# Patient Record
Sex: FEMALE | Race: ASIAN | NOT HISPANIC OR LATINO | Employment: OTHER | URBAN - METROPOLITAN AREA
[De-identification: names, ages, dates, MRNs, and addresses within clinical notes are randomized per-mention and may not be internally consistent; named-entity substitution may affect disease eponyms.]

---

## 2023-04-02 ENCOUNTER — APPOINTMENT (OUTPATIENT)
Dept: CT IMAGING | Facility: CLINIC | Age: 68
End: 2023-04-02
Attending: EMERGENCY MEDICINE
Payer: COMMERCIAL

## 2023-04-02 ENCOUNTER — HOSPITAL ENCOUNTER (EMERGENCY)
Facility: CLINIC | Age: 68
Discharge: ANOTHER HOSPITAL | End: 2023-04-03
Attending: EMERGENCY MEDICINE | Admitting: EMERGENCY MEDICINE
Payer: COMMERCIAL

## 2023-04-02 DIAGNOSIS — I63.531: ICD-10-CM

## 2023-04-02 LAB
ANION GAP SERPL CALCULATED.3IONS-SCNC: 11 MMOL/L (ref 7–15)
APTT PPP: 23 SECONDS (ref 22–38)
BASOPHILS # BLD AUTO: 0.1 10E3/UL (ref 0–0.2)
BASOPHILS NFR BLD AUTO: 1 %
BUN SERPL-MCNC: 11.7 MG/DL (ref 8–23)
CALCIUM SERPL-MCNC: 9.8 MG/DL (ref 8.8–10.2)
CHLORIDE SERPL-SCNC: 101 MMOL/L (ref 98–107)
CREAT SERPL-MCNC: 0.55 MG/DL (ref 0.51–0.95)
DEPRECATED HCO3 PLAS-SCNC: 26 MMOL/L (ref 22–29)
EOSINOPHIL # BLD AUTO: 0.6 10E3/UL (ref 0–0.7)
EOSINOPHIL NFR BLD AUTO: 6 %
ERYTHROCYTE [DISTWIDTH] IN BLOOD BY AUTOMATED COUNT: 12.9 % (ref 10–15)
GFR SERPL CREATININE-BSD FRML MDRD: >90 ML/MIN/1.73M2
GLUCOSE SERPL-MCNC: 127 MG/DL (ref 70–99)
HCT VFR BLD AUTO: 39.5 % (ref 35–47)
HGB BLD-MCNC: 12.7 G/DL (ref 11.7–15.7)
IMM GRANULOCYTES # BLD: 0 10E3/UL
IMM GRANULOCYTES NFR BLD: 0 %
INR PPP: 0.95 (ref 0.85–1.15)
LYMPHOCYTES # BLD AUTO: 1.3 10E3/UL (ref 0.8–5.3)
LYMPHOCYTES NFR BLD AUTO: 13 %
MCH RBC QN AUTO: 29.3 PG (ref 26.5–33)
MCHC RBC AUTO-ENTMCNC: 32.2 G/DL (ref 31.5–36.5)
MCV RBC AUTO: 91 FL (ref 78–100)
MONOCYTES # BLD AUTO: 0.4 10E3/UL (ref 0–1.3)
MONOCYTES NFR BLD AUTO: 4 %
NEUTROPHILS # BLD AUTO: 7.4 10E3/UL (ref 1.6–8.3)
NEUTROPHILS NFR BLD AUTO: 76 %
NRBC # BLD AUTO: 0 10E3/UL
NRBC BLD AUTO-RTO: 0 /100
PLATELET # BLD AUTO: 250 10E3/UL (ref 150–450)
POTASSIUM SERPL-SCNC: 3.9 MMOL/L (ref 3.4–5.3)
RBC # BLD AUTO: 4.33 10E6/UL (ref 3.8–5.2)
SODIUM SERPL-SCNC: 138 MMOL/L (ref 136–145)
TROPONIN T SERPL HS-MCNC: <6 NG/L
WBC # BLD AUTO: 9.7 10E3/UL (ref 4–11)

## 2023-04-02 PROCEDURE — 84484 ASSAY OF TROPONIN QUANT: CPT | Performed by: EMERGENCY MEDICINE

## 2023-04-02 PROCEDURE — 99291 CRITICAL CARE FIRST HOUR: CPT | Mod: 25

## 2023-04-02 PROCEDURE — 250N000011 HC RX IP 250 OP 636: Performed by: EMERGENCY MEDICINE

## 2023-04-02 PROCEDURE — 85610 PROTHROMBIN TIME: CPT | Performed by: EMERGENCY MEDICINE

## 2023-04-02 PROCEDURE — 96375 TX/PRO/DX INJ NEW DRUG ADDON: CPT

## 2023-04-02 PROCEDURE — 70450 CT HEAD/BRAIN W/O DYE: CPT | Mod: 76

## 2023-04-02 PROCEDURE — 82310 ASSAY OF CALCIUM: CPT | Performed by: EMERGENCY MEDICINE

## 2023-04-02 PROCEDURE — 85730 THROMBOPLASTIN TIME PARTIAL: CPT | Performed by: EMERGENCY MEDICINE

## 2023-04-02 PROCEDURE — 70498 CT ANGIOGRAPHY NECK: CPT

## 2023-04-02 PROCEDURE — 70496 CT ANGIOGRAPHY HEAD: CPT

## 2023-04-02 PROCEDURE — 85025 COMPLETE CBC W/AUTO DIFF WBC: CPT | Performed by: EMERGENCY MEDICINE

## 2023-04-02 PROCEDURE — 36415 COLL VENOUS BLD VENIPUNCTURE: CPT | Performed by: EMERGENCY MEDICINE

## 2023-04-02 PROCEDURE — 93005 ELECTROCARDIOGRAM TRACING: CPT

## 2023-04-02 PROCEDURE — 250N000009 HC RX 250: Performed by: EMERGENCY MEDICINE

## 2023-04-02 PROCEDURE — 70450 CT HEAD/BRAIN W/O DYE: CPT

## 2023-04-02 PROCEDURE — 96376 TX/PRO/DX INJ SAME DRUG ADON: CPT | Mod: 59

## 2023-04-02 PROCEDURE — 96365 THER/PROPH/DIAG IV INF INIT: CPT | Mod: 59

## 2023-04-02 RX ORDER — ONDANSETRON 2 MG/ML
4 INJECTION INTRAMUSCULAR; INTRAVENOUS
Status: COMPLETED | OUTPATIENT
Start: 2023-04-02 | End: 2023-04-02

## 2023-04-02 RX ORDER — FENTANYL CITRATE 50 UG/ML
50 INJECTION, SOLUTION INTRAMUSCULAR; INTRAVENOUS ONCE
Status: COMPLETED | OUTPATIENT
Start: 2023-04-02 | End: 2023-04-02

## 2023-04-02 RX ORDER — HYDROMORPHONE HYDROCHLORIDE 1 MG/ML
0.5 INJECTION, SOLUTION INTRAMUSCULAR; INTRAVENOUS; SUBCUTANEOUS ONCE
Status: COMPLETED | OUTPATIENT
Start: 2023-04-02 | End: 2023-04-02

## 2023-04-02 RX ORDER — IOPAMIDOL 755 MG/ML
500 INJECTION, SOLUTION INTRAVASCULAR ONCE
Status: COMPLETED | OUTPATIENT
Start: 2023-04-02 | End: 2023-04-02

## 2023-04-02 RX ADMIN — IOPAMIDOL 75 ML: 755 INJECTION, SOLUTION INTRAVENOUS at 19:12

## 2023-04-02 RX ADMIN — NICARDIPINE HYDROCHLORIDE 2.5 MG/HR: 0.2 INJECTION, SOLUTION INTRAVENOUS at 19:34

## 2023-04-02 RX ADMIN — HYDROMORPHONE HYDROCHLORIDE 0.5 MG: 1 INJECTION, SOLUTION INTRAMUSCULAR; INTRAVENOUS; SUBCUTANEOUS at 20:38

## 2023-04-02 RX ADMIN — SODIUM CHLORIDE 61 ML: 9 INJECTION, SOLUTION INTRAVENOUS at 19:15

## 2023-04-02 RX ADMIN — ONDANSETRON 4 MG: 2 INJECTION INTRAMUSCULAR; INTRAVENOUS at 19:28

## 2023-04-02 RX ADMIN — FENTANYL CITRATE 50 MCG: 50 INJECTION, SOLUTION INTRAMUSCULAR; INTRAVENOUS at 19:32

## 2023-04-02 RX ADMIN — ONDANSETRON 4 MG: 2 INJECTION INTRAMUSCULAR; INTRAVENOUS at 20:38

## 2023-04-02 ASSESSMENT — ENCOUNTER SYMPTOMS
BACK PAIN: 1
NUMBNESS: 0
VOMITING: 0
HEADACHES: 1
NAUSEA: 1
DIZZINESS: 1

## 2023-04-02 ASSESSMENT — ACTIVITIES OF DAILY LIVING (ADL)
ADLS_ACUITY_SCORE: 35

## 2023-04-03 ENCOUNTER — APPOINTMENT (OUTPATIENT)
Dept: OCCUPATIONAL THERAPY | Facility: CLINIC | Age: 68
DRG: 064 | End: 2023-04-03
Attending: STUDENT IN AN ORGANIZED HEALTH CARE EDUCATION/TRAINING PROGRAM
Payer: COMMERCIAL

## 2023-04-03 ENCOUNTER — APPOINTMENT (OUTPATIENT)
Dept: CARDIOLOGY | Facility: CLINIC | Age: 68
DRG: 064 | End: 2023-04-03
Attending: NURSE PRACTITIONER
Payer: COMMERCIAL

## 2023-04-03 ENCOUNTER — HOSPITAL ENCOUNTER (INPATIENT)
Facility: CLINIC | Age: 68
LOS: 2 days | Discharge: HOME | DRG: 064 | End: 2023-04-05
Attending: PSYCHIATRY & NEUROLOGY | Admitting: PSYCHIATRY & NEUROLOGY
Payer: COMMERCIAL

## 2023-04-03 ENCOUNTER — APPOINTMENT (OUTPATIENT)
Dept: MRI IMAGING | Facility: CLINIC | Age: 68
DRG: 064 | End: 2023-04-03
Attending: NURSE PRACTITIONER
Payer: COMMERCIAL

## 2023-04-03 VITALS
OXYGEN SATURATION: 99 % | TEMPERATURE: 98.3 F | BODY MASS INDEX: 20.24 KG/M2 | HEIGHT: 62 IN | SYSTOLIC BLOOD PRESSURE: 126 MMHG | WEIGHT: 110 LBS | HEART RATE: 72 BPM | RESPIRATION RATE: 19 BRPM | DIASTOLIC BLOOD PRESSURE: 63 MMHG

## 2023-04-03 DIAGNOSIS — I61.0 NONTRAUMATIC SUBCORTICAL HEMORRHAGE OF RIGHT CEREBRAL HEMISPHERE (H): Primary | ICD-10-CM

## 2023-04-03 DIAGNOSIS — M54.50 LOW BACK PAIN, UNSPECIFIED BACK PAIN LATERALITY, UNSPECIFIED CHRONICITY, UNSPECIFIED WHETHER SCIATICA PRESENT: ICD-10-CM

## 2023-04-03 DIAGNOSIS — I10 HYPERTENSION, UNSPECIFIED TYPE: ICD-10-CM

## 2023-04-03 PROBLEM — I61.9 ICH (INTRACEREBRAL HEMORRHAGE) (H): Status: ACTIVE | Noted: 2023-04-03

## 2023-04-03 LAB
ANION GAP SERPL CALCULATED.3IONS-SCNC: 13 MMOL/L (ref 7–15)
ATRIAL RATE - MUSE: 56 BPM
ATRIAL RATE - MUSE: 66 BPM
BUN SERPL-MCNC: 7.9 MG/DL (ref 8–23)
CALCIUM SERPL-MCNC: 9.1 MG/DL (ref 8.8–10.2)
CHLORIDE SERPL-SCNC: 105 MMOL/L (ref 98–107)
CREAT SERPL-MCNC: 0.48 MG/DL (ref 0.51–0.95)
DEPRECATED HCO3 PLAS-SCNC: 21 MMOL/L (ref 22–29)
DIASTOLIC BLOOD PRESSURE - MUSE: NORMAL MMHG
DIASTOLIC BLOOD PRESSURE - MUSE: NORMAL MMHG
ERYTHROCYTE [DISTWIDTH] IN BLOOD BY AUTOMATED COUNT: 12.8 % (ref 10–15)
GFR SERPL CREATININE-BSD FRML MDRD: >90 ML/MIN/1.73M2
GLUCOSE BLDC GLUCOMTR-MCNC: 109 MG/DL (ref 70–99)
GLUCOSE BLDC GLUCOMTR-MCNC: 111 MG/DL (ref 70–99)
GLUCOSE BLDC GLUCOMTR-MCNC: 112 MG/DL (ref 70–99)
GLUCOSE BLDC GLUCOMTR-MCNC: 123 MG/DL (ref 70–99)
GLUCOSE SERPL-MCNC: 107 MG/DL (ref 70–99)
HBA1C MFR BLD: 5.5 %
HCT VFR BLD AUTO: 39.7 % (ref 35–47)
HGB BLD-MCNC: 12.4 G/DL (ref 11.7–15.7)
INTERPRETATION ECG - MUSE: NORMAL
INTERPRETATION ECG - MUSE: NORMAL
LDLC SERPL DIRECT ASSAY-MCNC: 88 MG/DL
LVEF ECHO: NORMAL
MAGNESIUM SERPL-MCNC: 2 MG/DL (ref 1.7–2.3)
MCH RBC QN AUTO: 29 PG (ref 26.5–33)
MCHC RBC AUTO-ENTMCNC: 31.2 G/DL (ref 31.5–36.5)
MCV RBC AUTO: 93 FL (ref 78–100)
MRSA DNA SPEC QL NAA+PROBE: NEGATIVE
P AXIS - MUSE: 55 DEGREES
P AXIS - MUSE: 67 DEGREES
PLATELET # BLD AUTO: 250 10E3/UL (ref 150–450)
POTASSIUM SERPL-SCNC: 3.6 MMOL/L (ref 3.4–5.3)
PR INTERVAL - MUSE: 174 MS
PR INTERVAL - MUSE: 176 MS
QRS DURATION - MUSE: 74 MS
QRS DURATION - MUSE: 90 MS
QT - MUSE: 434 MS
QT - MUSE: 466 MS
QTC - MUSE: 449 MS
QTC - MUSE: 454 MS
R AXIS - MUSE: 0 DEGREES
R AXIS - MUSE: 8 DEGREES
RBC # BLD AUTO: 4.28 10E6/UL (ref 3.8–5.2)
SA TARGET DNA: POSITIVE
SARS-COV-2 RNA RESP QL NAA+PROBE: NEGATIVE
SODIUM SERPL-SCNC: 139 MMOL/L (ref 136–145)
SYSTOLIC BLOOD PRESSURE - MUSE: NORMAL MMHG
SYSTOLIC BLOOD PRESSURE - MUSE: NORMAL MMHG
T AXIS - MUSE: 29 DEGREES
T AXIS - MUSE: 30 DEGREES
VENTRICULAR RATE- MUSE: 56 BPM
VENTRICULAR RATE- MUSE: 66 BPM
WBC # BLD AUTO: 9.3 10E3/UL (ref 4–11)

## 2023-04-03 PROCEDURE — A9585 GADOBUTROL INJECTION: HCPCS | Performed by: PSYCHIATRY & NEUROLOGY

## 2023-04-03 PROCEDURE — U0005 INFEC AGEN DETEC AMPLI PROBE: HCPCS | Performed by: STUDENT IN AN ORGANIZED HEALTH CARE EDUCATION/TRAINING PROGRAM

## 2023-04-03 PROCEDURE — 85027 COMPLETE CBC AUTOMATED: CPT | Performed by: STUDENT IN AN ORGANIZED HEALTH CARE EDUCATION/TRAINING PROGRAM

## 2023-04-03 PROCEDURE — 258N000003 HC RX IP 258 OP 636: Performed by: STUDENT IN AN ORGANIZED HEALTH CARE EDUCATION/TRAINING PROGRAM

## 2023-04-03 PROCEDURE — 36415 COLL VENOUS BLD VENIPUNCTURE: CPT | Performed by: STUDENT IN AN ORGANIZED HEALTH CARE EDUCATION/TRAINING PROGRAM

## 2023-04-03 PROCEDURE — 99291 CRITICAL CARE FIRST HOUR: CPT | Mod: GC | Performed by: PSYCHIATRY & NEUROLOGY

## 2023-04-03 PROCEDURE — 82962 GLUCOSE BLOOD TEST: CPT

## 2023-04-03 PROCEDURE — 250N000013 HC RX MED GY IP 250 OP 250 PS 637: Performed by: STUDENT IN AN ORGANIZED HEALTH CARE EDUCATION/TRAINING PROGRAM

## 2023-04-03 PROCEDURE — 93306 TTE W/DOPPLER COMPLETE: CPT

## 2023-04-03 PROCEDURE — 87641 MR-STAPH DNA AMP PROBE: CPT | Performed by: STUDENT IN AN ORGANIZED HEALTH CARE EDUCATION/TRAINING PROGRAM

## 2023-04-03 PROCEDURE — 70546 MR ANGIOGRAPH HEAD W/O&W/DYE: CPT

## 2023-04-03 PROCEDURE — 999N000226 HC STATISTIC SLP IP EVAL DEFER: Performed by: SPEECH-LANGUAGE PATHOLOGIST

## 2023-04-03 PROCEDURE — 200N000002 HC R&B ICU UMMC

## 2023-04-03 PROCEDURE — 250N000011 HC RX IP 250 OP 636: Performed by: STUDENT IN AN ORGANIZED HEALTH CARE EDUCATION/TRAINING PROGRAM

## 2023-04-03 PROCEDURE — 70553 MRI BRAIN STEM W/O & W/DYE: CPT

## 2023-04-03 PROCEDURE — 93010 ELECTROCARDIOGRAM REPORT: CPT | Performed by: INTERNAL MEDICINE

## 2023-04-03 PROCEDURE — 83721 ASSAY OF BLOOD LIPOPROTEIN: CPT | Performed by: STUDENT IN AN ORGANIZED HEALTH CARE EDUCATION/TRAINING PROGRAM

## 2023-04-03 PROCEDURE — 82310 ASSAY OF CALCIUM: CPT | Performed by: STUDENT IN AN ORGANIZED HEALTH CARE EDUCATION/TRAINING PROGRAM

## 2023-04-03 PROCEDURE — 258N000003 HC RX IP 258 OP 636: Performed by: PSYCHIATRY & NEUROLOGY

## 2023-04-03 PROCEDURE — 999N000128 HC STATISTIC PERIPHERAL IV START W/O US GUIDANCE

## 2023-04-03 PROCEDURE — 97530 THERAPEUTIC ACTIVITIES: CPT | Mod: GO | Performed by: OCCUPATIONAL THERAPIST

## 2023-04-03 PROCEDURE — 70553 MRI BRAIN STEM W/O & W/DYE: CPT | Mod: 26 | Performed by: RADIOLOGY

## 2023-04-03 PROCEDURE — 93005 ELECTROCARDIOGRAM TRACING: CPT

## 2023-04-03 PROCEDURE — 83036 HEMOGLOBIN GLYCOSYLATED A1C: CPT | Performed by: STUDENT IN AN ORGANIZED HEALTH CARE EDUCATION/TRAINING PROGRAM

## 2023-04-03 PROCEDURE — 93306 TTE W/DOPPLER COMPLETE: CPT | Mod: 26 | Performed by: STUDENT IN AN ORGANIZED HEALTH CARE EDUCATION/TRAINING PROGRAM

## 2023-04-03 PROCEDURE — 97535 SELF CARE MNGMENT TRAINING: CPT | Mod: GO | Performed by: OCCUPATIONAL THERAPIST

## 2023-04-03 PROCEDURE — 97110 THERAPEUTIC EXERCISES: CPT | Mod: GO | Performed by: OCCUPATIONAL THERAPIST

## 2023-04-03 PROCEDURE — 97165 OT EVAL LOW COMPLEX 30 MIN: CPT | Mod: GO | Performed by: OCCUPATIONAL THERAPIST

## 2023-04-03 PROCEDURE — 250N000013 HC RX MED GY IP 250 OP 250 PS 637: Performed by: PSYCHIATRY & NEUROLOGY

## 2023-04-03 PROCEDURE — 83735 ASSAY OF MAGNESIUM: CPT | Performed by: PSYCHIATRY & NEUROLOGY

## 2023-04-03 PROCEDURE — 255N000002 HC RX 255 OP 636: Performed by: PSYCHIATRY & NEUROLOGY

## 2023-04-03 RX ORDER — POLYETHYLENE GLYCOL 3350 17 G/17G
17 POWDER, FOR SOLUTION ORAL DAILY
Status: DISCONTINUED | OUTPATIENT
Start: 2023-04-03 | End: 2023-04-05 | Stop reason: HOSPADM

## 2023-04-03 RX ORDER — ATORVASTATIN CALCIUM 10 MG/1
10 TABLET, FILM COATED ORAL DAILY
COMMUNITY

## 2023-04-03 RX ORDER — SODIUM CHLORIDE 9 MG/ML
INJECTION, SOLUTION INTRAVENOUS CONTINUOUS
Status: ACTIVE | OUTPATIENT
Start: 2023-04-03 | End: 2023-04-05

## 2023-04-03 RX ORDER — PROCHLORPERAZINE MALEATE 5 MG
5 TABLET ORAL EVERY 6 HOURS PRN
Status: DISCONTINUED | OUTPATIENT
Start: 2023-04-03 | End: 2023-04-05 | Stop reason: HOSPADM

## 2023-04-03 RX ORDER — ACETAMINOPHEN 325 MG/10.15ML
650 LIQUID ORAL EVERY 4 HOURS PRN
Status: DISCONTINUED | OUTPATIENT
Start: 2023-04-03 | End: 2023-04-05 | Stop reason: HOSPADM

## 2023-04-03 RX ORDER — TRAZODONE HYDROCHLORIDE 100 MG/1
100 TABLET ORAL AT BEDTIME
COMMUNITY

## 2023-04-03 RX ORDER — AMOXICILLIN 250 MG
1-2 CAPSULE ORAL 2 TIMES DAILY
Status: DISCONTINUED | OUTPATIENT
Start: 2023-04-03 | End: 2023-04-05 | Stop reason: HOSPADM

## 2023-04-03 RX ORDER — ONDANSETRON 4 MG/1
4 TABLET, ORALLY DISINTEGRATING ORAL EVERY 6 HOURS PRN
Status: DISCONTINUED | OUTPATIENT
Start: 2023-04-03 | End: 2023-04-05 | Stop reason: HOSPADM

## 2023-04-03 RX ORDER — ACETAMINOPHEN 325 MG/1
650 TABLET ORAL EVERY 4 HOURS PRN
Status: DISCONTINUED | OUTPATIENT
Start: 2023-04-03 | End: 2023-04-05 | Stop reason: HOSPADM

## 2023-04-03 RX ORDER — GADOBUTROL 604.72 MG/ML
7.5 INJECTION INTRAVENOUS ONCE
Status: COMPLETED | OUTPATIENT
Start: 2023-04-03 | End: 2023-04-03

## 2023-04-03 RX ORDER — ACETAMINOPHEN 650 MG/1
650 SUPPOSITORY RECTAL EVERY 4 HOURS PRN
Status: DISCONTINUED | OUTPATIENT
Start: 2023-04-03 | End: 2023-04-05 | Stop reason: HOSPADM

## 2023-04-03 RX ORDER — ATORVASTATIN CALCIUM 10 MG/1
10 TABLET, FILM COATED ORAL EVERY EVENING
Status: DISCONTINUED | OUTPATIENT
Start: 2023-04-03 | End: 2023-04-03

## 2023-04-03 RX ORDER — TRAZODONE HYDROCHLORIDE 100 MG/1
100 TABLET ORAL AT BEDTIME
Status: DISCONTINUED | OUTPATIENT
Start: 2023-04-04 | End: 2023-04-03

## 2023-04-03 RX ORDER — ALENDRONATE SODIUM 70 MG/1
70 TABLET ORAL
COMMUNITY

## 2023-04-03 RX ORDER — FLUTICASONE PROPIONATE 50 MCG
1 SPRAY, SUSPENSION (ML) NASAL DAILY PRN
Status: DISCONTINUED | OUTPATIENT
Start: 2023-04-03 | End: 2023-04-05 | Stop reason: HOSPADM

## 2023-04-03 RX ORDER — LABETALOL HYDROCHLORIDE 5 MG/ML
10 INJECTION, SOLUTION INTRAVENOUS EVERY 10 MIN PRN
Status: DISCONTINUED | OUTPATIENT
Start: 2023-04-03 | End: 2023-04-05 | Stop reason: HOSPADM

## 2023-04-03 RX ORDER — PROCHLORPERAZINE 25 MG
12.5 SUPPOSITORY, RECTAL RECTAL EVERY 12 HOURS PRN
Status: DISCONTINUED | OUTPATIENT
Start: 2023-04-03 | End: 2023-04-05 | Stop reason: HOSPADM

## 2023-04-03 RX ORDER — ATORVASTATIN CALCIUM 10 MG/1
10 TABLET, FILM COATED ORAL EVERY EVENING
Status: DISCONTINUED | OUTPATIENT
Start: 2023-04-03 | End: 2023-04-05 | Stop reason: HOSPADM

## 2023-04-03 RX ORDER — AMOXICILLIN 250 MG
1-2 CAPSULE ORAL 2 TIMES DAILY
Status: DISCONTINUED | OUTPATIENT
Start: 2023-04-03 | End: 2023-04-03

## 2023-04-03 RX ORDER — BISACODYL 10 MG
10 SUPPOSITORY, RECTAL RECTAL DAILY PRN
Status: DISCONTINUED | OUTPATIENT
Start: 2023-04-03 | End: 2023-04-05 | Stop reason: HOSPADM

## 2023-04-03 RX ORDER — ONDANSETRON 2 MG/ML
4 INJECTION INTRAMUSCULAR; INTRAVENOUS EVERY 6 HOURS PRN
Status: DISCONTINUED | OUTPATIENT
Start: 2023-04-03 | End: 2023-04-05 | Stop reason: HOSPADM

## 2023-04-03 RX ORDER — HYDRALAZINE HYDROCHLORIDE 20 MG/ML
10-20 INJECTION INTRAMUSCULAR; INTRAVENOUS
Status: DISCONTINUED | OUTPATIENT
Start: 2023-04-03 | End: 2023-04-05 | Stop reason: HOSPADM

## 2023-04-03 RX ADMIN — ONDANSETRON 4 MG: 2 INJECTION INTRAMUSCULAR; INTRAVENOUS at 06:17

## 2023-04-03 RX ADMIN — ONDANSETRON 4 MG: 4 TABLET, ORALLY DISINTEGRATING ORAL at 19:54

## 2023-04-03 RX ADMIN — GADOBUTROL 7.5 ML: 604.72 INJECTION INTRAVENOUS at 19:04

## 2023-04-03 RX ADMIN — ACETAMINOPHEN 650 MG: 325 TABLET, FILM COATED ORAL at 06:17

## 2023-04-03 RX ADMIN — SODIUM CHLORIDE: 9 INJECTION, SOLUTION INTRAVENOUS at 15:08

## 2023-04-03 RX ADMIN — SODIUM CHLORIDE: 9 INJECTION, SOLUTION INTRAVENOUS at 02:12

## 2023-04-03 RX ADMIN — SODIUM CHLORIDE 100 ML: 9 INJECTION, SOLUTION INTRAVENOUS at 19:05

## 2023-04-03 RX ADMIN — ACETAMINOPHEN 650 MG: 325 TABLET, FILM COATED ORAL at 02:20

## 2023-04-03 RX ADMIN — PROCHLORPERAZINE EDISYLATE 5 MG: 5 INJECTION INTRAMUSCULAR; INTRAVENOUS at 02:20

## 2023-04-03 RX ADMIN — ACETAMINOPHEN 650 MG: 325 TABLET, FILM COATED ORAL at 17:58

## 2023-04-03 RX ADMIN — PROCHLORPERAZINE EDISYLATE 5 MG: 5 INJECTION INTRAMUSCULAR; INTRAVENOUS at 09:03

## 2023-04-03 RX ADMIN — ATORVASTATIN CALCIUM 10 MG: 10 TABLET, FILM COATED ORAL at 21:38

## 2023-04-03 RX ADMIN — LABETALOL HYDROCHLORIDE 10 MG: 5 INJECTION, SOLUTION INTRAVENOUS at 16:25

## 2023-04-03 RX ADMIN — PROCHLORPERAZINE EDISYLATE 5 MG: 5 INJECTION INTRAMUSCULAR; INTRAVENOUS at 22:00

## 2023-04-03 RX ADMIN — ACETAMINOPHEN 650 MG: 325 TABLET, FILM COATED ORAL at 22:00

## 2023-04-03 RX ADMIN — HYDRALAZINE HYDROCHLORIDE 10 MG: 20 INJECTION INTRAMUSCULAR; INTRAVENOUS at 06:17

## 2023-04-03 ASSESSMENT — ACTIVITIES OF DAILY LIVING (ADL)
ADLS_ACUITY_SCORE: 26
ADLS_ACUITY_SCORE: 25
ADLS_ACUITY_SCORE: 37
ADLS_ACUITY_SCORE: 25
ADLS_ACUITY_SCORE: 39
WALKING_OR_CLIMBING_STAIRS_DIFFICULTY: NO
FALL_HISTORY_WITHIN_LAST_SIX_MONTHS: NO
ADLS_ACUITY_SCORE: 25
CHANGE_IN_FUNCTIONAL_STATUS_SINCE_ONSET_OF_CURRENT_ILLNESS/INJURY: NO
ADLS_ACUITY_SCORE: 25
DRESSING/BATHING_DIFFICULTY: NO
DIFFICULTY_EATING/SWALLOWING: NO
DOING_ERRANDS_INDEPENDENTLY_DIFFICULTY: NO
WEAR_GLASSES_OR_BLIND: NO
TOILETING_ISSUES: NO
ADLS_ACUITY_SCORE: 39
CONCENTRATING,_REMEMBERING_OR_MAKING_DECISIONS_DIFFICULTY: NO
ADLS_ACUITY_SCORE: 26
ADLS_ACUITY_SCORE: 25
ADLS_ACUITY_SCORE: 28

## 2023-04-03 ASSESSMENT — VISUAL ACUITY
OU: GLASSES

## 2023-04-03 NOTE — ED TRIAGE NOTES
"Patient presents with complaints of sudden onset headache, vomiting,  dizziness, and vision changes which started at 1430. Patient states \"worst headache of life\". Of note, patient did hit her head on Thursday. ABC intact without need for intervention at this time.         "

## 2023-04-03 NOTE — ED NOTES
RN attempted to call and give report to [facility] unit 4A twice. Both times RN was told that receiving nurse was busy and they would call back in about 5 minutes to get report.

## 2023-04-03 NOTE — PLAN OF CARE
Major Shift Events:    Q2 hour neuro checks initiated. AOx4. PERRLA. HA and associated nausea relieved w prn tylenol and compazine. x1 episode of nausea. L visual field cut present. Glasses worn at baseline. No N/T. Afebrile. VSS. SBP goal <140, prn hydralazine given. On RA. Swallow study passed. Up to bedside commode. NS MIV continued.     Plan:   Continue with POC, notify primary team with any changes in pt condition.     For vital signs and complete assessments, please see documentation flowsheets.

## 2023-04-03 NOTE — PLAN OF CARE
Admitted/transferred from: [facility] ED   Reason for admission/transfer: Close neurological monitoring   2 RN skin assessment: completed by: CLYDE Stinson and CLYDE Hearn   Result of skin assessment and interventions/actions: No interventions required   Height, weight, drug calc weight: Done  Patient belongings Done  MDRO education added to care plan: NA  ?

## 2023-04-03 NOTE — PLAN OF CARE
Time: 0700 - 1930    Major Shift Events: Frontal/parietal headache improved today, Tylenol given x1. Otherwise no acute neuro changes. SBP goal <140, labetalol given x1 with effect. Tele NSR. Tmax 99.3. Pt had nausea and dry heaving this AM, compazine given x1 with improvement, tolerating clear liquid diet by end of shift. No BM, but bowel sounds active. Voiding WDL, SBA to toilet. Ambulated with OT in room this AM. Pt went to MRI at 1815.       Plan: Continue to monitor neuro status, awaiting MRI result. Encourage activity as tolerated. Stroke education with patient learning tomorrow at 1330. Follow plan of care.     For vital signs and complete assessments, please see documentation flowsheets.       Goal Outcome Evaluation:      Plan of Care Reviewed With: patient, spouse    Overall Patient Progress: improving

## 2023-04-03 NOTE — PHARMACY-ADMISSION MEDICATION HISTORY
Pharmacist Admission Medication History    Admission medication history is complete. The information provided in this note is only as accurate as the sources available at the time of the update.    Medication reconciliation/reorder completed by provider prior to medication history? Yes    Information Source(s): Patient and CareEverywhere/SureScripts via in-person    Pertinent Information: Patient was a modest historian, recalling the types of medications she takes but not always their names or strengths.    Changes made to PTA medication list:    Added:  o Atorvastatin 10 mg tab PO every day  o Trazodone 100 mg tab PO at bedtime  o Alendronate 70 mg tab PO every day    Deleted: None    Changed: None    Allergies reviewed with patient and updates made in EHR: yes    Medication History Completed By: Germain Meier RPH 4/3/2023 1:31 PM    Prior to Admission Medications   Prescriptions Last Dose Informant Patient Reported? Taking?   alendronate (FOSAMAX) 70 MG tablet 4/3/2023  Yes Yes   Sig: Take 70 mg by mouth every 7 days   atorvastatin (LIPITOR) 10 MG tablet 4/3/2023  Yes Yes   Sig: Take 10 mg by mouth daily   traZODone (DESYREL) 100 MG tablet 4/2/2023  Yes Yes   Sig: Take 100 mg by mouth At Bedtime      Facility-Administered Medications: None

## 2023-04-03 NOTE — CARE PLAN
Speech Language Pathology: Orders received. Chart reviewed and discussed with patient and nursing staff.? Speech Language Pathology not indicated due to swallow function at baseline, passing nursing swallow screen this AM, and no other reported concerns regarding speech/language function.?Page sent to primary provider team regarding SLP plans to defer evaluation. Defer discharge recommendations to primary provider team.? Will complete orders.

## 2023-04-03 NOTE — ED PROVIDER NOTES
History     Chief Complaint:  Headache       HPI   Danna Fairchild is a 68 year old female with a history of hyperlipidemia who presents with headache. The patient reports falling and hitting her head three days ago. Around 1430 today, she had a sudden onset of headache, dizziness, nausea, and loss of peripheral vision. She describes the headache as the worst she has experienced, and states she does not get migraines or headaches frequently. She has not had these symptoms since the fall prior to today. Here in the ED, she reports feeling disoriented. Additional lower back pain from the fall. No vomiting, numbness, or tingling.       Independent Historian:   None - Patient Only    Review of External Notes:  None    ROS:  Review of Systems   Eyes: Positive for visual disturbance.   Gastrointestinal: Positive for nausea. Negative for vomiting.   Musculoskeletal: Positive for back pain.   Neurological: Positive for dizziness and headaches. Negative for numbness.   All other systems reviewed and are negative.      Allergies:  No known drug allergies     Medications:    Oxybutynin  Trazodone  Alendronate  Atorvastatin    Past Medical History:    Hyperlipidemia  Major depression  Anxiety  Osteoporosis  Lumbago    Past Surgical History:    Cataract removal   Puncture aspiration breast cyst  Bilateral wrist fracture ORIF    Family History:    Father- stomach cancer, hypertension, stroke, macular degeneration  Mother- hypertension, stroke, cataracts    Social History:  The patient presents via private vehicle   at bedside    Physical Exam     Patient Vitals for the past 24 hrs:   BP Temp Pulse Resp SpO2 Height Weight   04/02/23 2125 118/70 -- 59 11 95 % -- --   04/02/23 2115 119/76 -- 60 11 95 % -- --   04/02/23 2112 -- -- 59 10 97 % -- --   04/02/23 2111 -- -- 60 21 97 % -- --   04/02/23 2110 126/74 -- 63 29 94 % -- --   04/02/23 2055 112/67 -- 61 11 94 % -- --   04/02/23 2051 -- -- 62 11 91 % -- --   04/02/23 2044  "125/68 -- 65 17 100 % -- --   04/02/23 2002 -- -- 64 22 96 % -- --   04/02/23 2001 -- -- 62 11 98 % -- --   04/02/23 2000 135/73 -- 62 10 98 % -- --   04/02/23 1959 -- -- 58 15 100 % -- --   04/02/23 1958 -- -- 62 13 100 % -- --   04/02/23 1955 132/84 -- 66 -- -- -- --   04/02/23 1950 (!) 149/78 -- 59 14 99 % -- --   04/02/23 1946 -- -- 59 14 100 % -- --   04/02/23 1945 (!) 152/82 -- 59 -- -- -- --   04/02/23 1940 (!) 150/86 -- 60 14 100 % -- --   04/02/23 1935 (!) 153/82 -- 51 12 99 % -- --   04/02/23 1920 (!) 162/87 -- 54 19 100 % -- --   04/02/23 1903 -- -- -- -- -- 1.575 m (5' 2\") 49.9 kg (110 lb)   04/02/23 1901 (!) 158/78 98.3  F (36.8  C) 78 20 99 % -- --        Physical Exam  Nursing note and vitals reviewed.  HENT:   Mouth/Throat: Moist mucous membranes.   Eyes: EOMI, nonicteric sclera  Cardiovascular: Normal rate, regular rhythm, no murmurs, rubs, or gallops  Pulmonary/Chest: Effort normal and breath sounds normal. No respiratory distress. No wheezes. No rales.   Abdominal: Soft. Nontender, nondistended, no guarding or rigidity.   Musculoskeletal: Normal range of motion.   Neurological: Alert. Moves all extremities spontaneously.     CN's II-XII intact. 5/5 BUE and BLE strength. PERRL    EOMI without nystagmus.  Left-sided hemianopia.     Sensation intact to light touch. Negative pronator drift.    Finger to nose intact.   Skin: Skin is warm and dry. No rash noted.     Emergency Department Course   ECG  ECG taken at 1927, ECG read at 1930  Sinus bradycardia   Rate 56 bpm. MD interval 174 ms. QRS duration 74 ms. QT/QTc 466/449 ms. P-R-T axes 55 0 30.      Imaging:  CTA Head Neck with Contrast   Final Result   IMPRESSION:    HEAD CT:   1.  Approximately 3.3 cm intraparenchymal hematoma right occipital lobe and parieto-occipital lobes.   2.  There is hemorrhagic extension into the ventricular system and basal cisterns of the posterior fossa.   3.  No midline shift.      HEAD CTA:    1.  No contrast " extravasation.   2.  No vascular occlusion, stenosis, aneurysm or high flow vascular malformation.      NECK CTA:   1.  No hemodynamically significant stenosis in the neck vessels.    2.  No evidence for dissection.   3.  Changes of fibromuscular dysplasia involving bilateral internal carotid arteries and left vertebral artery V1 and V3 segments.      As discussed with Dr. Villalta at 1914 at 1934 Central time.      CT Head w/o Contrast   Final Result   IMPRESSION:    HEAD CT:   1.  Approximately 3.3 cm intraparenchymal hematoma right occipital lobe and parieto-occipital lobes.   2.  There is hemorrhagic extension into the ventricular system and basal cisterns of the posterior fossa.   3.  No midline shift.      HEAD CTA:    1.  No contrast extravasation.   2.  No vascular occlusion, stenosis, aneurysm or high flow vascular malformation.      NECK CTA:   1.  No hemodynamically significant stenosis in the neck vessels.    2.  No evidence for dissection.   3.  Changes of fibromuscular dysplasia involving bilateral internal carotid arteries and left vertebral artery V1 and V3 segments.      As discussed with Dr. Villalta at 1914 at 1934 Central time.         Report per radiology    Laboratory:  Labs Ordered and Resulted from Time of ED Arrival to Time of ED Departure   BASIC METABOLIC PANEL - Abnormal       Result Value    Sodium 138      Potassium 3.9      Chloride 101      Carbon Dioxide (CO2) 26      Anion Gap 11      Urea Nitrogen 11.7      Creatinine 0.55      Calcium 9.8      Glucose 127 (*)     GFR Estimate >90     INR - Normal    INR 0.95     PARTIAL THROMBOPLASTIN TIME - Normal    aPTT 23     TROPONIN T, HIGH SENSITIVITY - Normal    Troponin T, High Sensitivity <6     GLUCOSE MONITOR NURSING POCT   CBC WITH PLATELETS AND DIFFERENTIAL    WBC Count 9.7      RBC Count 4.33      Hemoglobin 12.7      Hematocrit 39.5      MCV 91      MCH 29.3      MCHC 32.2      RDW 12.9      Platelet Count 250      % Neutrophils  76      % Lymphocytes 13      % Monocytes 4      % Eosinophils 6      % Basophils 1      % Immature Granulocytes 0      NRBCs per 100 WBC 0      Absolute Neutrophils 7.4      Absolute Lymphocytes 1.3      Absolute Monocytes 0.4      Absolute Eosinophils 0.6      Absolute Basophils 0.1      Absolute Immature Granulocytes 0.0      Absolute NRBCs 0.0          Emergency Department Course & Assessments:     Interventions:  Medications   niCARdipine 40 mg in 200 mL NS (CARDENE) infusion (0 mg/hr Intravenous Stopped 4/2/23 2059)   Sodium Chloride for CT Scan Flush Use (61 mLs Intravenous $Given 4/2/23 1915)   iopamidol (ISOVUE-370) solution 500 mL (75 mLs Intravenous $Given 4/2/23 1912)   ondansetron (ZOFRAN) injection 4 mg (4 mg Intravenous $Given 4/2/23 2038)   fentaNYL (PF) (SUBLIMAZE) injection 50 mcg (50 mcg Intravenous $Given 4/2/23 1932)   HYDROmorphone (PF) (DILAUDID) injection 0.5 mg (0.5 mg Intravenous $Given 4/2/23 2038)      Assessments:  1856 I obtained history and examined the patient as noted above.   1858 Tier 1 code stroke.   1901 First blood pressure is 158/78.    Independent Interpretation (X-rays, CTs, rhythm strip):  Noncontrast head CT independently reviewed and right occipital intracranial hemorrhage with ventricular extension noted.  Mild shift posteriorly.    Consultations/Discussion of Management or Tests:  1902 I spoke with Dr. Spencer from stroke neurology regarding the patient's presentation.   1915 I spoke with [place] radiology regarding imaging results. There is a right posterior cerebral circulation hemorrhagic infarction.   1930 I spoke with JOEY Rg, from neurosurgery regarding the patient's presentation and imaging. Will not plan for intervention unless patient presentation changes.   2036 I spoke with Dr. Phuc Garcia from [facility] neurocritical unit. Plan for patient transfer.     Social Determinants of Health affecting care:   None    Disposition:  The [facility]. Dr. Garcia accepted the patient for transfer.     Impression & Plan      Medical Decision Making:  Patient presents with acute onset headache, described as worst ever with onset at 230pm today.  Complaint prompted a tier 1 code stroke.  Neurologic exam was only positive for left-sided hemianopia.  Noncontrast CT showed hemorrhage in the right occipital lobe which explains vision loss.  Patient remained stable during time in ED including 4-hour repeat head CT.  She was briefly started on a nicardipine drip for blood pressure control, but this was able to be weaned off after better pain control.  No evidence of hydrocephalus.  I discussed case with neurosurgery and neuro critical care.  No planned intervention pending clinical status.  Uncertain etiology of bleed as CTA is negative for obvious aneurysmal bleed or AVM.  She will likely need MRI for further evaluation.  ICU bed available at [facility] where patient will be transferred.  Her and her  are in agreement with this plan.  All questions answered.    Critical Care time:  Was 45 minutes for this patient excluding procedures.    Diagnosis:    ICD-10-CM    1. Posterior cerebral circulation hemorrhagic infarction, right (H)  I63.531             Scribe Disclosure:  ILita, am serving as a scribe at 7:08 PM on 4/2/2023 to document services personally performed by Jose Villalta MD based on my observations and the provider's statements to me.           Jose Villalta MD  04/03/23 [id number]       Jose Villalta MD  04/03/23 9423

## 2023-04-03 NOTE — CONSULTS
[facility]    Stroke Telephone Note    I was called by Jose Villalta on 04/02/23 regarding patient Danna Fairchild. The patient is a 68 year old female who had sudden onset of severe headache at 2:30 pm. In the ED, her neurological exam is significant for left hemianopia, according to the ED MD. Of note, the patient did have an episode of loss of consciousness 3 days ago (3/30/23) and she did fall and hit her head. The patient is not on anticoagulants.     Stroke Code Data (for stroke code without tele)  Stroke code activated 04/02/23   1900   Stroke provider first response  04/02/23   1901            Last known normal 04/02/23   1430        Time of discovery   (or onset of symptoms) 04/02/23   1430   Head CT read by Stroke Neuro Dr/Provider 04/02/23   1914   Was stroke code de-escalated? No              Imaging Findings   Head CT: right occipital ICH, about 25 mL in volume, with subarachnoid and intraventricular hemorrhage and hydrocephalic changes of the left lateral ventricle. The hemorrhage density is heterogenous but there is no spot sign.   CTA head and neck: unremarkable.     Intravenous Thrombolysis  Not given due to:   - active bleeding    Endovascular Treatment  Intracranial hemorrhage not LVO     Impression  Non-traumatic intracerebral hemorrhage of unknown cause as workup is incomplete. The lobar location is suggestive of cerebral amyloid angiopathy. There is history of trauma 3 days ago but unlikely to cause a late hemorrhage. the patient is not on anticoagulation.       Recommendations   Acute Hemorrhagic Stroke Recommendations:   - Patient will need to be transferred to a hospital with neurocritical care and neurosurgerical capability.   - Neurochecks and Vital Signs every 30 min for the first 4 hours then every 1 hour  - Systolic BP Goal: 130-150  - Head of bed elevated   - Telemetry, EKG  - Neurosurgery consult   - Imaging: repeat head CT in 4 hours  - Bedside Glucose  "Monitoring  - A1c, Troponin x 3  - PT/OT/SLP  - Stroke Education  - Euthermia, Euglycemia    My recommendations are based on the information provided over the phone by Danna Fairchild's in-person providers. They are not intended to replace the clinical judgment of her in-person providers. I was not requested to personally see or examine the patient at this time.      Allison Spencer MD, Msc, FAKERA, FAAN   of Neurology  [facility]     04/02/2023 7:26 PM  To page me or covering stroke neurology team member, click here: AMCOM  Choose \"On Call\" tab at top, then search dropdown box for \"Neurology Adult\" & press Enter, look for Neuro ICU/Stroke      "

## 2023-04-03 NOTE — PROGRESS NOTES
04/03/23 0900   Living Environment   People in Home spouse   Current Living Arrangements house   Home Accessibility stairs to enter home;stairs within home   Number of Stairs, Main Entrance   (3)   Stair Railings, Main Entrance none   Number of Stairs, Within Home, Primary   (one flight to bedroom upper level one flight to basement pt does use for laundry and treadmill)   Stair Railings, Within Home, Primary railings safe and in good condition  (one side up and one side down for rails)   Transportation Anticipated car, drives self   Self-Care   Usual Activity Tolerance excellent   Current Activity Tolerance moderate   Regular Exercise Yes   Activity/Exercise Type swimming;walking   General Information   Onset of Illness/Injury or Date of Surgery 04/03/23   Referring Physician Lisa Flores MD   Additional Occupational Profile Info/Pertinent History of Current Problem Ms. Danna Fairchild is a 68 year old woman with little past medical history admitted on 4/3/23 as a transfer from [facility] for right occipital/parietal intraparenchymal hemorrhage with intraventricular extension.  She does not have a known history of hypertension.  She did have a fall 3 days prior to onset of sudden severe headache, per her description is likely a syncopal event, where she did lose consciousness and hit her head however. Not on pta aspirin or anticoagulation. Due to location of bleed as well as intraventricular extension (risk of obstructive hydrocephalus developing), she was transferred to the ICU at [facility] for further monitoring and neuro critical care management.  Overall exam is very reassuring on her arrival to neuro critical care unit.   Existing Precautions/Restrictions fall;other (see comments)  (SBP >140)   Visual Perception   Impact of Vision Impairment on Function (Vision) Left field cut unable to track past 45 degrees from midline, unable to notice visual stimuli until midline when approaching from the  left, (neglect) when standing behind pt for visual screen   Range of Motion Comprehensive   Comment, General Range of Motion WFL   Strength Comprehensive (MMT)   Comment, General Manual Muscle Testing (MMT) Assessment overall deconditioned   Bed Mobility   Comment (Bed Mobility) SBA   Transfers   Transfer Comments SBA   Lower Body Dressing Assessment/Training   Comment, (Lower Body Dressing) SBA after education   Clinical Impression   Criteria for Skilled Therapeutic Interventions Met (OT) Yes, treatment indicated   OT Diagnosis decreased ind in I/ADLS   OT Problem List-Impairments impacting ADL problems related to;activity tolerance impaired;strength;vision   ADL comments/analysis decreased ind in I/ADLS   Assessment of Occupational Performance 1-3 Performance Deficits   Planned Therapy Interventions (OT) ADL retraining;IADL retraining;strengthening;home program guidelines;progressive activity/exercise;visual perception;risk factor education   Clinical Decision Making Complexity (OT) low complexity   Risk & Benefits of therapy have been explained evaluation/treatment results reviewed;care plan/treatment goals reviewed;patient   OT Total Evaluation Time   OT Eval, Low Complexity Minutes (22184) 5

## 2023-04-03 NOTE — H&P
Neuroscience Intensive Care History & Physical    Reason for critical care admission: intracranial hemorrhage  Admitting Team: Neuro ICU  Date of Service:  04/03/2023  Date of Admission: 04/03/23  Hospital Day: 1    Assessment/Plan  Ms. Danna Fairchild is a 68 year old woman with little past medical history admitted on 4/3/23 as a transfer from [facility] for right occipital/parietal intraparenchymal hemorrhage with intraventricular extension.  She does not have a known history of hypertension.  She did have a fall 3 days prior to onset of sudden severe headache, per her description is likely a syncopal event, where she did lose consciousness and hit her head however. Not on pta aspirin or anticoagulation. Due to location of bleed as well as intraventricular extension (risk of obstructive hydrocephalus developing), she was transferred to the ICU at [facility] for further monitoring and neuro critical care management.  Overall exam is very reassuring on her arrival to neuro critical care unit.    24 hour events: PBD 0, transferred to [facility]    Neuro    #Right Occipitoparietal Hemorrhage, ICH Score = 1, NIHSS = 2  Initial head CT 4/2 at 19:00 shows Right occipitoparietal hemorrhage up to 39mm in diameter with associated ventricular extension and mass effect. 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  Follow up CT 4/2 at 23:00 largely stable without rapidly increasing size of hemorrhage nor worsening mass effect. Of note, hemorrhage extends into basal cisterns and 3rd and 4th ventricle, would be cautious for risk of developing obstructive hydrocephalus (low threshold to consult neurosurgery for EVD).   -Repeat head CT 4 hours from original was stable  -Neurochecks every 2 hrs  -HOB > 30   -SBP goal < 140 mmHg  -avoid hypotonic fluids  -PT/OT/SLP    #depression  #anxiety  #insomnia  -pta trazodone 100mg  qhs    #Analgesics & sedation  -IVF, IV compazine, acetaminophen prn for HA    CV  -Cardiac monitoring  -SBP goal < 140 mmHg  -PRN labetalol and hydralazine    #HLD  -continue pta atorvastatin 10mg daily    Resp  #SHAREE  -Continuous pulse ox  -Maintain O2 saturations greater than 92%    #allergic rhinitis  -pta flonase 1 spray into both nostrils daily prn    GI  Diet: regular  GI prophylaxis: not indicated  -Bowel regimen: scheduled senna-docusate and Miralax    Renal/  #urge incontinence  -pta oxybutynin 5mg daily    -Daily BMP  -IV fluids: 75mL/hr NS  -Electrolyte replacement protocols    Endo  #SHAREE  -Hgb A1c  -Monitor glucose levels    #osteoporosis  -pta alendronate weekly (hold for now)    Heme  #SHAREE  -Daily CBC  -Hgb goal >7, plt goal >50k  -Transfuse to meet Hgb and plt goals    ID  #SHAREE  -Daily CBC  -Follow temperature curve    ICU Check List  Lines/tubes/drains: PIV  FEN: NS at 75c/hr, regular diet, replete per protocol  PPX: DVT - SCDs (pharm contraindicated due to bleed); GI - not needed  Code: FULL (discussed with patient at bedside  Dispo: ICU - NCC    Clinically Significant Risk Factors Present on Admission                            # Compression of brain         The patient was discussed with the NCC attending, Dr. Garcia.     Lisa Flores MD  PGY4 Neurology      History of Present Illness:  Ms. Danna Fairchild is a 68 year old woman with a past medical history of urge incontinence, osteoporosis, allergic rhinitis, HLD, depression/anxiety, insomnia who was transferred from [facility] to [facility] on 4/3/23 for intraparenchymal hemorrhage. She presented to [facility] on 04/02/23 with headache and nausea/vomiting around 14:30. Recent history of fall and head trauma (3/30) with LOC. Stroke code was activated on arrival to [facility], exam notable for left hemianopia per chart review. She was found to have a right occipital/parietal ICH with IVH. Blood pressure elevated to 150/160s systolic, lowered with  "nicardipine drip. Given fentanyl and hydromorphone for pain. Subsequently transferred to [facility] for neuro critical care management.     On history and exam at bedside today Ms. Fairchild notes that she currently has a 5 out of 10 headache.  She endorses being largely helpfully at baseline without history of chronic headaches.  She notes that day of presentation 4/2/2023 she had sudden onset of \"worst headache of my life\" at approximately 2:00 PM a friend drove her home and she took a nap, when she woke up she attempted to use her phone and realized that she could not see out of the left side of her vision, subsequently presented to the emergency department at [facility].  Evaluation was done and found a right ICH.  She been experiencing some severe headache and nausea on and off since initial onset of severe headache at 2 PM.  No trauma on day presentation 4/2, however she did note that she recently had a fall where she hit her head.  Early a.m. on 3/30 she had awoken from sleep and went to use the restroom.  After using the bathroom and standing up from seated she had an episode that she describes as \"vasovagal\" fainting.  She notes that this is happened to her 1 time previously in her life.  She woke up on the floor of the bathroom.  She felt mostly normal and went back to bed afterwards.  She later discovered a bump on her head that was tender to the touch, but not until a day or 2 later.  She does endorse a lot of recent stress including sick family pet who is currently hospitalized at the [facility] hospital.  She attributed her spell to a combination of stress and syncope.  No history of seizures or epilepsy.  No tongue bite or urinary incontinence.    She has not been feeling unwell recently, no fever, chills, diarrhea, cough, shortness of breath, chest pain.  No nausea, vomiting (prior to onset of headache).      ALLERGIES:  No Known Allergies    PAST MEDICAL HISTORY:   PAST SURGICAL HISTORY: " "  -Osteoporosis  -Insomnia, depression, anxiety  -Hyperlipidemia    FAMILY HISTORY:   Problem Relation Age of Onset     Cataract Birth Mother     Macular Degeneration Birth Mother   possibly AMD     Hypertension Birth Mother     Stroke Birth Mother     Hypertension Birth Father     Stroke Birth Father   around 2009     Macular Degeneration Birth Father     Cancer Birth Father   Stomach cancer     Cancer, Breast Other   maternal gt aunt     SOCIAL HISTORY:   Flutist, drummer. Never tobacco use. Occasional alcohol (1 drink per week or less). No other substance use.  She is a musician.    ROS: The 10 point Review of Systems is negative other than noted in the HPI or here.     Current Medications:  Current Facility-Administered Medications   Medication     acetaminophen (TYLENOL) tablet 650 mg    Or     acetaminophen (TYLENOL) solution 650 mg    Or     acetaminophen (TYLENOL) Suppository 650 mg     atorvastatin (LIPITOR) tablet 10 mg     bisacodyl (DULCOLAX) suppository 10 mg     hydrALAZINE (APRESOLINE) injection 10-20 mg     labetalol (NORMODYNE/TRANDATE) injection 10 mg     ondansetron (ZOFRAN ODT) ODT tab 4 mg    Or     ondansetron (ZOFRAN) injection 4 mg     polyethylene glycol (MIRALAX) Packet 17 g     prochlorperazine (COMPAZINE) injection 5 mg    Or     prochlorperazine (COMPAZINE) tablet 5 mg    Or     prochlorperazine (COMPAZINE) suppository 12.5 mg     senna-docusate (SENOKOT-S/PERICOLACE) 8.6-50 MG per tablet 1-2 tablet     sodium chloride 0.9% infusion     [START ON 4/4/2023] traZODone (DESYREL) tablet 100 mg         Physical Examination:  Vitals: Vital signs:                         Estimated body mass index is 20.12 kg/m  as calculated from the following:    Height as of 4/2/23: 1.575 m (5' 2\").    Weight as of 4/2/23: 49.9 kg (110 lb).    Physical Exam:  Constitutional: Alert. Lying in bed comfortably. No acute distress.   Head: Atraumatic, normocephalic. No significant scalp abrasion palpated  ENT: " Moist mucous membranes. No sinus drainage. Neck supple.  Cardiovascular: Appears warm, well-perfused, and non-toxic. Regular rate  Respiratory: No respiratory distress. No increased work of breathing, no accessory muscle use.   Gastrointestinal: Soft, nontender, nondistended   Musculoskeletal: Moving all four limbs spontaneously  Skin: No rashes appreciated on visualized skin.   Hematologic/Lymphatic/Immunologic: No bruising appreciated on visualized skin.     Neurologic Exam:   Mental Status: Alert and oriented to place, date, self and reasons of hospitalization.  Following commands. Naming, repetition intact without paraphasic errors. No dysarthria. Able to perform simple calculations.   Cranial Nerves: PERRL, EOMI without nystagmus, face symmetric, facial sensation intact, nondysarthric, shoulder shrug strong, tongue midline. Not wearing glasses (home with ) so vision exam somewhat confounded. Does appear to have a homonymous hemianopsia on blink to threat however.  Motor: No pronator or sensory drift. Strength 5/5 proximally and distally. Tone normal. No abnormal movements.   Reflexes: brisk but symmetric throughout at biceps, brachioradialis, patellars and achilles. No ankle clonus, toes upgoing.   Sensory: Intact to light touch in all four extremities. No sensory extinction.   Coordination: FNF intact without dysmetria, finger tapping symmetric  Gait: Deferred due to falls risk/critical illness     NIHSS  Interval transfer (04/03/23 0203)   1a. Level of Consciousness 0-->Alert, keenly responsive   1b. LOC Questions 0-->Answers both questions correctly   1c. LOC Commands 0-->Performs both tasks correctly   2.   Best Gaze 0-->Normal   3.   Visual 2-->Complete hemianopia   4.   Facial Palsy 0-->Normal symmetrical movements   5a. Motor Arm, Left 0-->No drift, limb holds 90 (or 45) degrees for full 10 secs   5b. Motor Arm, Right 0-->No drift, limb holds 90 (or 45) degrees for full 10 secs   6a. Motor Leg,  Left 0-->No drift, leg holds 30 degree position for full 5 secs   6b. Motor Leg, right 0-->No drift, leg holds 30 degree position for full 5 secs   7.   Limb Ataxia 0-->Absent   8.   Sensory 0-->Normal, no sensory loss   9.   Best Language 0-->No aphasia, normal   10. Dysarthria 0-->Normal   11. Extinction and Inattention  0-->No abnormality   Total 2 (04/03/23 0203)     ICH Score (at arrival)  Scoring Tool Score   Age ? 80 years 1 point No   GCS score  3-4   5-12   13-15   2 point  1 point  0 point GCS 13-15   Hematoma volume, cm 3 ? 30 1 point No   Intraventricular extension 1 point Yes   Infratentorial location 1 point No   Total 1       Labs:  Recent Labs   Lab 04/02/23 1910      POTASSIUM 3.9   CHLORIDE 101   CO2 26   BUN 11.7   CR 0.55   KRISTA 9.8       Recent Labs   Lab 04/02/23 1910   WBC 9.7   HGB 12.7          No results for input(s): PH, PCO2, PO2, HCO3 in the last 168 hours.    All cultures:  No results for input(s): CULTURE in the last 168 hours.    Imaging:    Results for orders placed or performed during the hospital encounter of 04/02/23 (from the past 24 hour(s))   CBC with Platelets & Differential    Narrative    The following orders were created for panel order CBC with Platelets & Differential.  Procedure                               Abnormality         Status                     ---------                               -----------         ------                     CBC with platelets and d...[[id number]]                      Final result                 Please view results for these tests on the individual orders.   Basic metabolic panel   Result Value Ref Range    Sodium 138 136 - 145 mmol/L    Potassium 3.9 3.4 - 5.3 mmol/L    Chloride 101 98 - 107 mmol/L    Carbon Dioxide (CO2) 26 22 - 29 mmol/L    Anion Gap 11 7 - 15 mmol/L    Urea Nitrogen 11.7 8.0 - 23.0 mg/dL    Creatinine 0.55 0.51 - 0.95 mg/dL    Calcium 9.8 8.8 - 10.2 mg/dL    Glucose 127 (H) 70 - 99 mg/dL    GFR Estimate  >90 >60 mL/min/1.73m2   INR   Result Value Ref Range    INR 0.95 0.85 - 1.15   Partial thromboplastin time   Result Value Ref Range    aPTT 23 22 - 38 Seconds   Troponin T, High Sensitivity   Result Value Ref Range    Troponin T, High Sensitivity <6 <=14 ng/L   CBC with platelets and differential   Result Value Ref Range    WBC Count 9.7 4.0 - 11.0 10e3/uL    RBC Count 4.33 3.80 - 5.20 10e6/uL    Hemoglobin 12.7 11.7 - 15.7 g/dL    Hematocrit 39.5 35.0 - 47.0 %    MCV 91 78 - 100 fL    MCH 29.3 26.5 - 33.0 pg    MCHC 32.2 31.5 - 36.5 g/dL    RDW 12.9 10.0 - 15.0 %    Platelet Count 250 150 - 450 10e3/uL    % Neutrophils 76 %    % Lymphocytes 13 %    % Monocytes 4 %    % Eosinophils 6 %    % Basophils 1 %    % Immature Granulocytes 0 %    NRBCs per 100 WBC 0 <1 /100    Absolute Neutrophils 7.4 1.6 - 8.3 10e3/uL    Absolute Lymphocytes 1.3 0.8 - 5.3 10e3/uL    Absolute Monocytes 0.4 0.0 - 1.3 10e3/uL    Absolute Eosinophils 0.6 0.0 - 0.7 10e3/uL    Absolute Basophils 0.1 0.0 - 0.2 10e3/uL    Absolute Immature Granulocytes 0.0 <=0.4 10e3/uL    Absolute NRBCs 0.0 10e3/uL   CT Head w/o Contrast    Narrative    EXAM: CT HEAD W/O CONTRAST, CTA HEAD NECK W CONTRAST  LOCATION: [facility]  DATE/TIME: 4/2/2023 7:11 PM    INDICATION: Stroke, acute onset dizziness, vomiting, headache and visual changes.  COMPARISON: None.  CONTRAST: 75mL Isovue 370  TECHNIQUE: Head and neck CT angiogram with IV contrast. Noncontrast head CT followed by axial helical CT images of the head and neck vessels obtained during the arterial phase of intravenous contrast administration. Axial 2D reconstructed images and   multiplanar 3D MIP reconstructed images of the head and neck vessels were performed by the technologist. Dose reduction techniques were used. All stenosis measurements made according to NASCET criteria unless otherwise specified.    FINDINGS:   NONCONTRAST HEAD CT:   INTRACRANIAL CONTENTS: Roughly 3.1 x 3.3 x 3.4  cm (AP, TR, CC) intraparenchymal hematoma right occipital and parieto-occipital lobes. There is of 5 to 7 mm rim of surrounding edema. There is hemorrhage extension into the adjacent subarachnoid space,   right lateral ventricle, third ventricle, fourth ventricle and the right cerebellopontine angle and retro vermian cisterns. Trace hemorrhage in the dependent portion of the left occipital horn and frontal horn. No midline shift. No CT evidence of acute   infarct. Normal parenchymal attenuation. No hydrocephalus. Mild mass effect on the right lateral ventricle.     VISUALIZED ORBITS/SINUSES/MASTOIDS: No intraorbital abnormality. Moderate mucosal thickening scattered about the paranasal sinuses. No middle ear or mastoid effusion.    BONES/SOFT TISSUES: No acute abnormality.    HEAD CTA:  ANTERIOR CIRCULATION: No stenosis/occlusion, aneurysm, or high flow vascular malformation. Fetal origin of both posterior cerebral arteries from the anterior circulation.    POSTERIOR CIRCULATION: No spot sign or contrast extravasation. Mild mass effect on the right posterior cerebral artery distal branches secondary to the posterior cerebral hematoma. No stenosis/occlusion, aneurysm, or high flow vascular malformation.   Dominant left vertebral artery supplies the basilar artery with a small right vertebral artery supplying the right posterior inferior cerebellar artery (PICA).     DURAL VENOUS SINUSES: Expected enhancement of the major dural venous sinuses.    NECK CTA:  RIGHT CAROTID: No measurable stenosis or dissection. Changes of moderate fibromuscular dysplasia in the mid and distal cervical ICA.    LEFT CAROTID: No measurable stenosis or dissection. Changes of moderate fibromuscular dysplasia in the mid and distal cervical ICA.    VERTEBRAL ARTERIES: No focal stenosis or dissection. Dominant left and smaller right vertebral arteries. Mild changes of fibromuscular dysplasia left vertebral artery V1 and V3 segments.    AORTIC  ARCH: Classic aortic arch anatomy with no significant stenosis at the origin of the great vessels.    NONVASCULAR STRUCTURES: Unremarkable.      Impression    IMPRESSION:   HEAD CT:  1.  Approximately 3.3 cm intraparenchymal hematoma right occipital lobe and parieto-occipital lobes.  2.  There is hemorrhagic extension into the ventricular system and basal cisterns of the posterior fossa.  3.  No midline shift.    HEAD CTA:   1.  No contrast extravasation.  2.  No vascular occlusion, stenosis, aneurysm or high flow vascular malformation.    NECK CTA:  1.  No hemodynamically significant stenosis in the neck vessels.   2.  No evidence for dissection.  3.  Changes of fibromuscular dysplasia involving bilateral internal carotid arteries and left vertebral artery V1 and V3 segments.    As discussed with Dr. Villalta at 1914 at 1934 Central time.   CTA Head Neck with Contrast    Narrative    EXAM: CT HEAD W/O CONTRAST, CTA HEAD NECK W CONTRAST  LOCATION: [facility]  DATE/TIME: 4/2/2023 7:11 PM    INDICATION: Stroke, acute onset dizziness, vomiting, headache and visual changes.  COMPARISON: None.  CONTRAST: 75mL Isovue 370  TECHNIQUE: Head and neck CT angiogram with IV contrast. Noncontrast head CT followed by axial helical CT images of the head and neck vessels obtained during the arterial phase of intravenous contrast administration. Axial 2D reconstructed images and   multiplanar 3D MIP reconstructed images of the head and neck vessels were performed by the technologist. Dose reduction techniques were used. All stenosis measurements made according to NASCET criteria unless otherwise specified.    FINDINGS:   NONCONTRAST HEAD CT:   INTRACRANIAL CONTENTS: Roughly 3.1 x 3.3 x 3.4 cm (AP, TR, CC) intraparenchymal hematoma right occipital and parieto-occipital lobes. There is of 5 to 7 mm rim of surrounding edema. There is hemorrhage extension into the adjacent subarachnoid space,   right lateral  ventricle, third ventricle, fourth ventricle and the right cerebellopontine angle and retro vermian cisterns. Trace hemorrhage in the dependent portion of the left occipital horn and frontal horn. No midline shift. No CT evidence of acute   infarct. Normal parenchymal attenuation. No hydrocephalus. Mild mass effect on the right lateral ventricle.     VISUALIZED ORBITS/SINUSES/MASTOIDS: No intraorbital abnormality. Moderate mucosal thickening scattered about the paranasal sinuses. No middle ear or mastoid effusion.    BONES/SOFT TISSUES: No acute abnormality.    HEAD CTA:  ANTERIOR CIRCULATION: No stenosis/occlusion, aneurysm, or high flow vascular malformation. Fetal origin of both posterior cerebral arteries from the anterior circulation.    POSTERIOR CIRCULATION: No spot sign or contrast extravasation. Mild mass effect on the right posterior cerebral artery distal branches secondary to the posterior cerebral hematoma. No stenosis/occlusion, aneurysm, or high flow vascular malformation.   Dominant left vertebral artery supplies the basilar artery with a small right vertebral artery supplying the right posterior inferior cerebellar artery (PICA).     DURAL VENOUS SINUSES: Expected enhancement of the major dural venous sinuses.    NECK CTA:  RIGHT CAROTID: No measurable stenosis or dissection. Changes of moderate fibromuscular dysplasia in the mid and distal cervical ICA.    LEFT CAROTID: No measurable stenosis or dissection. Changes of moderate fibromuscular dysplasia in the mid and distal cervical ICA.    VERTEBRAL ARTERIES: No focal stenosis or dissection. Dominant left and smaller right vertebral arteries. Mild changes of fibromuscular dysplasia left vertebral artery V1 and V3 segments.    AORTIC ARCH: Classic aortic arch anatomy with no significant stenosis at the origin of the great vessels.    NONVASCULAR STRUCTURES: Unremarkable.      Impression    IMPRESSION:   HEAD CT:  1.  Approximately 3.3 cm  intraparenchymal hematoma right occipital lobe and parieto-occipital lobes.  2.  There is hemorrhagic extension into the ventricular system and basal cisterns of the posterior fossa.  3.  No midline shift.    HEAD CTA:   1.  No contrast extravasation.  2.  No vascular occlusion, stenosis, aneurysm or high flow vascular malformation.    NECK CTA:  1.  No hemodynamically significant stenosis in the neck vessels.   2.  No evidence for dissection.  3.  Changes of fibromuscular dysplasia involving bilateral internal carotid arteries and left vertebral artery V1 and V3 segments.    As discussed with Dr. Villalta at 1914 at 1934 Central time.   EKG 12-lead, tracing only   Result Value Ref Range    Systolic Blood Pressure  mmHg    Diastolic Blood Pressure  mmHg    Ventricular Rate 56 BPM    Atrial Rate 56 BPM    MA Interval 174 ms    QRS Duration 74 ms     ms    QTc 449 ms    P Axis 55 degrees    R AXIS 0 degrees    T Axis 30 degrees    Interpretation ECG       Sinus bradycardia  Otherwise normal ECG  No previous ECGs available     Head CT w/o contrast    Narrative    EXAM: CT HEAD W/O CONTRAST  LOCATION: [facility]  DATE/TIME: 4/2/2023 11:17 PM    INDICATION: known right occipital bleed. Evaluation of interval change.  COMPARISON: 04/02/2023.  TECHNIQUE: Routine CT Head without IV contrast. Multiplanar reformats. Dose reduction techniques were used.    FINDINGS:  INTRACRANIAL CONTENTS: Again visualized is the stable right occipital lobe intraparenchymal hemorrhage with surrounding edema. There is fairly stable in size in the interval. There is again a small amount of surrounding associated subarachnoid   hemorrhage. Intraventricular extension of hemorrhage is again visualized. There is no new extra-axial fluid collection or new intraparenchymal hemorrhage. There is no evidence of a midline shift. No CT evidence of acute infarct. Normal parenchymal   attenuation. The ventricular size is stable  in the interval and within normal limits for the patient's age.     VISUALIZED ORBITS/SINUSES/MASTOIDS: No intraorbital abnormality. No paranasal sinus mucosal disease. No middle ear or mastoid effusion.    BONES/SOFT TISSUES: No acute abnormality.      Impression    IMPRESSION:  1.  Stable acute right occipital lobe intraparenchymal hemorrhage with mild amount of surrounding edema and associated subarachnoid hemorrhage.  2.  Stable intraventricular extension of hemorrhage.  3.  Ventricular size stable in the interval and within normal limits for age.   Glucose by meter   Result Value Ref Range    GLUCOSE BY METER POCT 111 (H) 70 - 99 mg/dL           All relevant imaging and laboratory values personally reviewed.

## 2023-04-03 NOTE — PROGRESS NOTES
Neurosurgery note    Neurosurgery was contacted by the [facility] emergency department regarding this 68-year-old female with sudden onset of headache at 2:30 PM today, neurologic exam was significant for left hemianopia otherwise intact.  Patient did fall 3 days ago and hit her head.  Patient is not anticoagulated    Head CT scan demonstrates     IMPRESSION:   HEAD CT:  1.  Approximately 3.3 cm intraparenchymal hematoma right occipital lobe and parieto-occipital lobes.  2.  There is hemorrhagic extension into the ventricular system and basal cisterns of the posterior fossa.  3.  No midline shift.    Plan    Agree with neurology plan, and will defer to them for medical management.  No neurosurgical intervention is planned at this time.  We will continue to monitor and follow along if the patient is transferred to [facility] ICU.    Discussed with Dr. Pulido

## 2023-04-04 ENCOUNTER — APPOINTMENT (OUTPATIENT)
Dept: EDUCATION SERVICES | Facility: CLINIC | Age: 68
DRG: 064 | End: 2023-04-04
Attending: STUDENT IN AN ORGANIZED HEALTH CARE EDUCATION/TRAINING PROGRAM
Payer: COMMERCIAL

## 2023-04-04 ENCOUNTER — APPOINTMENT (OUTPATIENT)
Dept: OCCUPATIONAL THERAPY | Facility: CLINIC | Age: 68
DRG: 064 | End: 2023-04-04
Attending: PSYCHIATRY & NEUROLOGY
Payer: COMMERCIAL

## 2023-04-04 LAB
ANION GAP SERPL CALCULATED.3IONS-SCNC: 12 MMOL/L (ref 7–15)
BUN SERPL-MCNC: 7.6 MG/DL (ref 8–23)
CALCIUM SERPL-MCNC: 9.1 MG/DL (ref 8.8–10.2)
CHLORIDE SERPL-SCNC: 109 MMOL/L (ref 98–107)
CREAT SERPL-MCNC: 0.49 MG/DL (ref 0.51–0.95)
DEPRECATED HCO3 PLAS-SCNC: 23 MMOL/L (ref 22–29)
ERYTHROCYTE [DISTWIDTH] IN BLOOD BY AUTOMATED COUNT: 12.8 % (ref 10–15)
GFR SERPL CREATININE-BSD FRML MDRD: >90 ML/MIN/1.73M2
GLUCOSE BLDC GLUCOMTR-MCNC: 108 MG/DL (ref 70–99)
GLUCOSE SERPL-MCNC: 100 MG/DL (ref 70–99)
HCT VFR BLD AUTO: 36.1 % (ref 35–47)
HGB BLD-MCNC: 11.7 G/DL (ref 11.7–15.7)
MAGNESIUM SERPL-MCNC: 2.2 MG/DL (ref 1.7–2.3)
MCH RBC QN AUTO: 29.3 PG (ref 26.5–33)
MCHC RBC AUTO-ENTMCNC: 32.4 G/DL (ref 31.5–36.5)
MCV RBC AUTO: 90 FL (ref 78–100)
PHOSPHATE SERPL-MCNC: 3.1 MG/DL (ref 2.5–4.5)
PLATELET # BLD AUTO: 234 10E3/UL (ref 150–450)
POTASSIUM SERPL-SCNC: 3.5 MMOL/L (ref 3.4–5.3)
RBC # BLD AUTO: 4 10E6/UL (ref 3.8–5.2)
SODIUM SERPL-SCNC: 144 MMOL/L (ref 136–145)
WBC # BLD AUTO: 8.3 10E3/UL (ref 4–11)

## 2023-04-04 PROCEDURE — 80048 BASIC METABOLIC PNL TOTAL CA: CPT | Performed by: STUDENT IN AN ORGANIZED HEALTH CARE EDUCATION/TRAINING PROGRAM

## 2023-04-04 PROCEDURE — 85027 COMPLETE CBC AUTOMATED: CPT | Performed by: STUDENT IN AN ORGANIZED HEALTH CARE EDUCATION/TRAINING PROGRAM

## 2023-04-04 PROCEDURE — 250N000013 HC RX MED GY IP 250 OP 250 PS 637: Performed by: STUDENT IN AN ORGANIZED HEALTH CARE EDUCATION/TRAINING PROGRAM

## 2023-04-04 PROCEDURE — 97110 THERAPEUTIC EXERCISES: CPT | Mod: GO | Performed by: OCCUPATIONAL THERAPIST

## 2023-04-04 PROCEDURE — 999N000147 HC STATISTIC PT IP EVAL DEFER: Performed by: PHYSICAL THERAPIST

## 2023-04-04 PROCEDURE — 258N000003 HC RX IP 258 OP 636: Performed by: STUDENT IN AN ORGANIZED HEALTH CARE EDUCATION/TRAINING PROGRAM

## 2023-04-04 PROCEDURE — 93005 ELECTROCARDIOGRAM TRACING: CPT

## 2023-04-04 PROCEDURE — 250N000011 HC RX IP 250 OP 636: Performed by: STUDENT IN AN ORGANIZED HEALTH CARE EDUCATION/TRAINING PROGRAM

## 2023-04-04 PROCEDURE — 36415 COLL VENOUS BLD VENIPUNCTURE: CPT | Performed by: STUDENT IN AN ORGANIZED HEALTH CARE EDUCATION/TRAINING PROGRAM

## 2023-04-04 PROCEDURE — 250N000013 HC RX MED GY IP 250 OP 250 PS 637: Performed by: PSYCHIATRY & NEUROLOGY

## 2023-04-04 PROCEDURE — 99233 SBSQ HOSP IP/OBS HIGH 50: CPT | Mod: GC | Performed by: PSYCHIATRY & NEUROLOGY

## 2023-04-04 PROCEDURE — 97535 SELF CARE MNGMENT TRAINING: CPT | Mod: GO | Performed by: OCCUPATIONAL THERAPIST

## 2023-04-04 PROCEDURE — 83735 ASSAY OF MAGNESIUM: CPT | Performed by: PSYCHIATRY & NEUROLOGY

## 2023-04-04 PROCEDURE — 84100 ASSAY OF PHOSPHORUS: CPT | Performed by: PSYCHIATRY & NEUROLOGY

## 2023-04-04 PROCEDURE — 93010 ELECTROCARDIOGRAM REPORT: CPT | Performed by: INTERNAL MEDICINE

## 2023-04-04 PROCEDURE — 200N000002 HC R&B ICU UMMC

## 2023-04-04 PROCEDURE — 97530 THERAPEUTIC ACTIVITIES: CPT | Mod: GO | Performed by: OCCUPATIONAL THERAPIST

## 2023-04-04 RX ORDER — POTASSIUM CHLORIDE 750 MG/1
20 TABLET, EXTENDED RELEASE ORAL ONCE
Status: COMPLETED | OUTPATIENT
Start: 2023-04-04 | End: 2023-04-04

## 2023-04-04 RX ORDER — LIDOCAINE 4 G/G
1 PATCH TOPICAL
Status: DISCONTINUED | OUTPATIENT
Start: 2023-04-04 | End: 2023-04-05 | Stop reason: HOSPADM

## 2023-04-04 RX ADMIN — ATORVASTATIN CALCIUM 10 MG: 10 TABLET, FILM COATED ORAL at 20:15

## 2023-04-04 RX ADMIN — HYDRALAZINE HYDROCHLORIDE 10 MG: 20 INJECTION INTRAMUSCULAR; INTRAVENOUS at 07:26

## 2023-04-04 RX ADMIN — SODIUM CHLORIDE: 9 INJECTION, SOLUTION INTRAVENOUS at 10:31

## 2023-04-04 RX ADMIN — ACETAMINOPHEN 650 MG: 325 TABLET, FILM COATED ORAL at 15:23

## 2023-04-04 RX ADMIN — ACETAMINOPHEN 650 MG: 325 TABLET, FILM COATED ORAL at 19:45

## 2023-04-04 RX ADMIN — ONDANSETRON 4 MG: 2 INJECTION INTRAMUSCULAR; INTRAVENOUS at 07:45

## 2023-04-04 RX ADMIN — PROCHLORPERAZINE EDISYLATE 5 MG: 5 INJECTION INTRAMUSCULAR; INTRAVENOUS at 14:15

## 2023-04-04 RX ADMIN — LIDOCAINE PATCH 4% 1 PATCH: 40 PATCH TOPICAL at 10:22

## 2023-04-04 RX ADMIN — LABETALOL HYDROCHLORIDE 10 MG: 5 INJECTION, SOLUTION INTRAVENOUS at 14:15

## 2023-04-04 RX ADMIN — PROCHLORPERAZINE EDISYLATE 5 MG: 5 INJECTION INTRAMUSCULAR; INTRAVENOUS at 20:14

## 2023-04-04 RX ADMIN — ACETAMINOPHEN 650 MG: 325 TABLET, FILM COATED ORAL at 09:19

## 2023-04-04 RX ADMIN — POTASSIUM CHLORIDE 20 MEQ: 750 TABLET, EXTENDED RELEASE ORAL at 14:08

## 2023-04-04 RX ADMIN — SENNOSIDES AND DOCUSATE SODIUM 1 TABLET: 8.6; 5 TABLET ORAL at 07:45

## 2023-04-04 RX ADMIN — PROCHLORPERAZINE EDISYLATE 5 MG: 5 INJECTION INTRAMUSCULAR; INTRAVENOUS at 08:25

## 2023-04-04 RX ADMIN — POLYETHYLENE GLYCOL 3350 17 G: 17 POWDER, FOR SOLUTION ORAL at 07:45

## 2023-04-04 RX ADMIN — HYDRALAZINE HYDROCHLORIDE 10 MG: 20 INJECTION INTRAMUSCULAR; INTRAVENOUS at 21:01

## 2023-04-04 ASSESSMENT — ACTIVITIES OF DAILY LIVING (ADL)
ADLS_ACUITY_SCORE: 28

## 2023-04-04 ASSESSMENT — VISUAL ACUITY
OU: NORMAL ACUITY

## 2023-04-04 NOTE — PROGRESS NOTES
Neurocritical Care Progress Note    Reason for critical care admission: Right Occipital-Parietal ICH + IVH  Admitting Team: NCC  Date of Service:  04/04/2023  Date of Admission:  4/3/2023  Hospital Day: 2    Assessment/Plan  Danna Fairchild is a 68 year old female w/ PMHx HLD, urge incontinence, anxiety, and depression who presented on 4/3/2023 as a transfer from [facility] for a R occipital-parietal ICH w/ IVH of unclear origin. She did have a fall 3d prior with sudden onset HA leading to likely syncopal event with LOC and head hit. Given the location of the bleed and associated IVH, she was transferred to [facility] for further monitoring    24 hour events:  No acute events overnight. Patient reports that her HA and nausea was improved overnight, but has returned this morning. She also notes new pain in her R-side low back/SI joint    Neuro  #Right Occipitoparietal ICH w/ IVH of unclear etiology (ICH Score 1, NIHSS 2)  #Left Homonymous Hemianopsia  Presented as a transfer from [facility] on 4/3. Unclear origin of bleed, but 3d prior had a sudden onset HA leading to what sounds to be a syncopal event causing LOC, fall, and head hit. Initial CTA Head/Neck notes no aneurysm or dissection, but bilateral ICA's and L vertebral artery (V1, V3) exhibit changes suggestive of possible fibromuscular dysplasia  -PBD 1  -Neurochecks every 2 hrs  -MRI Brain (4/3): similar appearance of R occipital ICH w/ IVH + small R temporoparietal convexity SDH  -MRV Head (4/3): No evidence of thrombus  -Cerebral Angiogram  -NS 0.9% 75 ml/hr  -SBP goal < 140 mmHg  -HOB > 30   -Avoid hypotonic fluids  -PT/OT/SLP    #Analgesics & sedation  RASS goal: 0  -Tylenol 650mg Q4h prn  -Lidocaine Patch   -Compazine prn    #Anxiety/Depression  -PTA Trazodone 100mg Qhs    CV  #SHAREE  -Cardiac monitoring  -SBP goal < 140 mmHg   -PRN Labetalol and Hydralazine    #HLD  -PTA Atorvastatin 10mg daily    Resp  #SHAREE  Oxygen/vent: RA  -Continuous pulse  ox  -Maintain O2 saturations greater than 92%    #Allergic Rhinitis  -PTA Flonase 1 spray both nostrils daily prn    GI  #SHAREE  Diet: Regular  Last BM: PTA  GI prophylaxis: not indicated  Bowel regimen: scheduled senna-docusate and Miralax    Renal/  #Urge Incontinence  -PTA Oxybutynin 5mg daily  -Daily BMP  -IV fluids: NS 0.9% 75ml/hr  -Electrolyte replacement protocol    Endo  #SHAREE  Hgb A1c (4/3): 5.5  -Monitor glucose levels    #Osteoporosis  -Hold PTA Alendronate weekly    Heme  #SHAREE  -Daily CBC  -Hgb goal >7, plt goal >50k  -Transfuse to meet Hgb and plt goals    ID  #SHAREE  -Daily CBC  -Follow temperature curve    ICU Checklist  Lines/tubes/drains: PIV  FEN: Regular Diet; NS 75 ml/hr  PPX: DVT - SCDs, pharmacologic ppx contraindicated; GI - not indicated  Code: FULL per discussion with patient on 4/3/2023  Dispo: ICU - NCC    Clinically Significant Risk Factors                                 Zakiya Jimenez MD  Neurology PGY3    24 Hour Vital Signs Summary:  Temp: 99.1  F (37.3  C) Temp  Min: 98.4  F (36.9  C)  Max: 99.3  F (37.4  C)  Resp: (!) 9 Resp  Min: 9  Max: 22  SpO2: 98 % SpO2  Min: 96 %  Max: 100 %  Pulse: 70 Pulse  Min: 52  Max: 88    No data recorded  BP: 118/56 Systolic (24hrs), Av , Min:114 , Max:151   Diastolic (24hrs), Av, Min:56, Max:98    Respiratory monitoring:   Resp: (!) 9      I/O last 3 completed shifts:  In: 2100 [P.O.:300; I.V.:1800]  Out: -     Current Medications:    atorvastatin  10 mg Oral or Feeding Tube QPM     lidocaine  1 patch Transdermal Q24H     lidocaine   Transdermal Q8H JEFFREY     polyethylene glycol  17 g Oral or NG Tube Daily     senna-docusate  1-2 tablet Oral or Feeding Tube BID       PRN Medications:  acetaminophen **OR** acetaminophen **OR** acetaminophen, bisacodyl, fluticasone, hydrALAZINE, labetalol, ondansetron **OR** ondansetron, prochlorperazine **OR** prochlorperazine **OR** prochlorperazine    Infusions:    sodium chloride 75 mL/hr at 23 0900  "      No Known Allergies    Physical Examination:  Vitals: /56   Pulse 70   Temp 99.1  F (37.3  C) (Oral)   Resp (!) 9   Ht 1.575 m (5' 2.01\")   Wt 51.6 kg (113 lb 12.1 oz)   SpO2 98%   BMI 20.80 kg/m    Constitutional: Alert. Lying in bed. No acute distress.   HEENT: Normocephalic/Atraumatic. No significant scalp abrasion palpated  Cardiovascular: Appears well-perfused  Respiratory: No increased work of breathing on RA   Gastrointestinal: Nondistended   Back: R-side LB pain but no aggravation w/ palpation  Skin: No rashes, bruising, or wounds appreciated on visualized skin  Neuro:   Mental Status: Awake, alert, oriented to person, place, and situation. Able to provide an adequate history, follow commands, and answer questions w/o issue. Naming intact. Able to spell WORLD forwards/backwards. Able to follow 3-step w/ crossed midline commands. Speech is clear and fluid, no concern for aphasia or neglect  Cranial Nerves: PERRL, conjugate gaze, EOMI w/o nystagmus, L homonymous hemianopsia, facial sensation intact, no facial asymmetry, hearing intact to conversation, no dysarthria, tongue is midline  Motor: Normal bulk and tone. No abnormal movements. Strength is 5/5 in all extremities in both proximal and distal muscle groups. No drift w/ bilateral arm raise  Sensory: Intact to light touch in all four extremities. No sensory extinction.   Coordination: FNF and HS intact bilaterally  Reflexes: 2+, brisk, and symmetric reflexes in the bilateral biceps, brachioradialis, knees, and ankles. Down-going toes bilaterally, no clonus  Gait: Deferred due to falls risk/critical illness     ICH Score (at arrival)  Scoring Tool Score   Age ? 80 years 1 point No   GCS score  3-4   5-12   13-15   2 point  1 point  0 point GCS 13-15   Hematoma volume, cm 3 ? 30 1 point No   Intraventricular extension 1 point Yes   Infratentorial location 1 point No   Total 1       Labs and Imaging:    All relevant imaging and laboratory " values personally reviewed.

## 2023-04-04 NOTE — CONSULTS
Stroke Education Note    The following information has been reviewed with the patient and spouse:    1. Warning signs of stroke    2. Calling 911 if having warning signs of stroke    3. All modifiable risk factors: hypertension, CAD, atrial fib, diabetes, hypercholesterolemia, smoking, substance abuse, diet, physical inactivity, obesity, sleep apnea.    4. Patient's risk factors for stroke which include: HLD, anxiety and depression    5. Follow-up plan for after discharge    6. Discharge medications which include: Lipitor    In addition, the above information was given to the patient and spouse in writing as a part of the [facility] Stroke Class Handout.    Learner's response to risk factors / lifestyle modification education: Activation     Valeria Marie RN

## 2023-04-04 NOTE — PLAN OF CARE
Goal Outcome Evaluation:    Major Shift Events: Pt BP elevated intermittently, gave one dose of PRN medication to keep SBP < 140. Afebrile. SR. On RA. Walking in robison SBA. Complaining of mild headache and lower back pain, giving Tylenol PRN and applied lidocaine patch. Neuro exam intact besides L field cut. Voiding spontaneously, had small BM this evening. Poor appetite, will be NPO at [facility] for IR angiogram.     Plan: Pain control, monitor neuro exams.     For vital signs and complete assessments, please see documentation flowsheets.

## 2023-04-04 NOTE — PROGRESS NOTES
Major Shift Events:  No acute events. Continues to have L visual field cut. PRN Tylenol x1 for R frontal/parietal HA w/ good relief. Otherwise neuro intact. SBP < 140 w/o intervention. Intermittent nausea, improved w/ PRN compazine. Voiding spontaneously. No BM. SBA to toilet.     Plan: Continue to monitor neuro status. Stroke education today.  For vital signs and complete assessments, please see documentation flowsheets.

## 2023-04-04 NOTE — CONSULTS
Care Management Initial Consult    General Information  Assessment completed with: Spouse or significant other, Cayetano  Type of CM/SW Visit: Initial Assessment    Primary Care Provider verified and updated as needed: Yes   Readmission within the last 30 days: no previous admission in last 30 days      Reason for Consult: discharge planning  Advance Care Planning: Advance Care Planning Reviewed: no concerns identified  pt  states HCD is on file with pt's primary care provider with [facility] system       Communication Assessment  Patient's communication style: spoken language (English or Bilingual)    Hearing Difficulty or Deaf: yes   Wear Glasses or Blind: no    Cognitive  Cognitive/Neuro/Behavioral: WDL  Level of Consciousness: lethargic  Arousal Level: opens eyes spontaneously  Orientation: oriented x 4  Mood/Behavior: cooperative, calm  Best Language: 0 - No aphasia  Speech: clear, spontaneous    Living Environment:   People in home: child(gabbi), adult, spouse  Cayetano and adult daughter  Current living Arrangements: house (multi level home, stairs to second floor and basement.)      Able to return to prior arrangements: yes       Family/Social Support:  Care provided by: self  Provides care for: no one  Marital Status:   , Children  Cayetano       Description of Support System: Supportive, Involved         Current Resources:   Patient receiving home care services: No     Community Resources: None  Equipment currently used at home: none  Supplies currently used at home: None    Employment/Financial:  Employment Status: employed part-time        Financial Concerns: insurance, none   Referral to Financial Worker: No       Lifestyle & Psychosocial Needs:  Social Determinants of Health     Tobacco Use: Not on file   Alcohol Use: Not on file   Financial Resource Strain: Not on file   Food Insecurity: Not on file   Transportation Needs: Not on file   Physical Activity: Not on file   Stress: Not on file    Social Connections: Not on file   Intimate Partner Violence: Not on file   Depression: Not on file   Housing Stability: Not on file       Functional Status:  Prior to admission patient needed assistance:         Assesssment of Functional Status: Not at baseline with ADL Functioning    Mental Health Status:  Mental Health Status: No Current Concerns       Chemical Dependency Status:  Chemical Dependency Status: No Current Concerns             Values/Beliefs:  Spiritual, Cultural Beliefs, Restorationist Practices, Values that affect care: no               Additional Information:  Initial assessment completed due to stroke. See details above.   SW met with pt and  bedside-pt was resting/unable to participate in assessment.Pt  Cayetano states they have a number of friends and family who are able to help when pt goes home, including two adult children-one who lives with pt and .  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 Pt  stated pt has HCD on file with pt primary care doctor with [facility].      Care management will follow for any discharge needs.      ANGELA Lagunas, CARENSW  4A & 4E ICU   Pager: [phone]  Phone: [phone]

## 2023-04-04 NOTE — PLAN OF CARE
PT: Orders received. Chart reviewed and discussed with care team.  PT not indicated due to lack of skilled PT needs.  Discussed pt's status with OT over the past two days and also observed pt ambulating with OT. Pt denies any balance/gait concerns. Pt will work further with OT related to endurance limitations. Defer discharge recommendations to OT.  Will complete orders.

## 2023-04-05 ENCOUNTER — APPOINTMENT (OUTPATIENT)
Dept: OCCUPATIONAL THERAPY | Facility: CLINIC | Age: 68
DRG: 064 | End: 2023-04-05
Attending: PSYCHIATRY & NEUROLOGY
Payer: COMMERCIAL

## 2023-04-05 ENCOUNTER — APPOINTMENT (OUTPATIENT)
Dept: INTERVENTIONAL RADIOLOGY/VASCULAR | Facility: CLINIC | Age: 68
DRG: 064 | End: 2023-04-05
Attending: PSYCHIATRY & NEUROLOGY
Payer: COMMERCIAL

## 2023-04-05 VITALS
SYSTOLIC BLOOD PRESSURE: 132 MMHG | OXYGEN SATURATION: 98 % | WEIGHT: 110.45 LBS | BODY MASS INDEX: 20.33 KG/M2 | RESPIRATION RATE: 11 BRPM | HEART RATE: 55 BPM | HEIGHT: 62 IN | TEMPERATURE: 97.8 F | DIASTOLIC BLOOD PRESSURE: 69 MMHG

## 2023-04-05 LAB
ANION GAP SERPL CALCULATED.3IONS-SCNC: 14 MMOL/L (ref 7–15)
ANION GAP SERPL CALCULATED.3IONS-SCNC: 17 MMOL/L (ref 7–15)
APTT PPP: 22 SECONDS (ref 22–38)
ATRIAL RATE - MUSE: 56 BPM
BUN SERPL-MCNC: 8.1 MG/DL (ref 8–23)
BUN SERPL-MCNC: 9 MG/DL (ref 8–23)
CA-I BLD-MCNC: 4.1 MG/DL (ref 4.4–5.2)
CALCIUM SERPL-MCNC: 8.6 MG/DL (ref 8.8–10.2)
CALCIUM SERPL-MCNC: 8.8 MG/DL (ref 8.8–10.2)
CHLORIDE SERPL-SCNC: 105 MMOL/L (ref 98–107)
CHLORIDE SERPL-SCNC: 106 MMOL/L (ref 98–107)
CREAT SERPL-MCNC: 0.41 MG/DL (ref 0.51–0.95)
CREAT SERPL-MCNC: 0.42 MG/DL (ref 0.51–0.95)
DEPRECATED HCO3 PLAS-SCNC: 16 MMOL/L (ref 22–29)
DEPRECATED HCO3 PLAS-SCNC: 19 MMOL/L (ref 22–29)
DIASTOLIC BLOOD PRESSURE - MUSE: NORMAL MMHG
ERYTHROCYTE [DISTWIDTH] IN BLOOD BY AUTOMATED COUNT: 13 % (ref 10–15)
ERYTHROCYTE [DISTWIDTH] IN BLOOD BY AUTOMATED COUNT: 13.1 % (ref 10–15)
GFR SERPL CREATININE-BSD FRML MDRD: >90 ML/MIN/1.73M2
GFR SERPL CREATININE-BSD FRML MDRD: >90 ML/MIN/1.73M2
GLUCOSE SERPL-MCNC: 103 MG/DL (ref 70–99)
GLUCOSE SERPL-MCNC: 99 MG/DL (ref 70–99)
HCT VFR BLD AUTO: 36.1 % (ref 35–47)
HCT VFR BLD AUTO: 36.4 % (ref 35–47)
HGB BLD-MCNC: 11.5 G/DL (ref 11.7–15.7)
HGB BLD-MCNC: 11.6 G/DL (ref 11.7–15.7)
INR PPP: 0.99 (ref 0.85–1.15)
INTERPRETATION ECG - MUSE: NORMAL
MAGNESIUM SERPL-MCNC: 2.1 MG/DL (ref 1.7–2.3)
MCH RBC QN AUTO: 29.1 PG (ref 26.5–33)
MCH RBC QN AUTO: 29.1 PG (ref 26.5–33)
MCHC RBC AUTO-ENTMCNC: 31.9 G/DL (ref 31.5–36.5)
MCHC RBC AUTO-ENTMCNC: 31.9 G/DL (ref 31.5–36.5)
MCV RBC AUTO: 91 FL (ref 78–100)
MCV RBC AUTO: 91 FL (ref 78–100)
P AXIS - MUSE: 74 DEGREES
PHOSPHATE SERPL-MCNC: 2.6 MG/DL (ref 2.5–4.5)
PLATELET # BLD AUTO: 224 10E3/UL (ref 150–450)
PLATELET # BLD AUTO: 239 10E3/UL (ref 150–450)
POTASSIUM SERPL-SCNC: 3.1 MMOL/L (ref 3.4–5.3)
POTASSIUM SERPL-SCNC: 3.5 MMOL/L (ref 3.4–5.3)
POTASSIUM SERPL-SCNC: 3.5 MMOL/L (ref 3.4–5.3)
PR INTERVAL - MUSE: 176 MS
QRS DURATION - MUSE: 88 MS
QT - MUSE: 450 MS
QTC - MUSE: 434 MS
R AXIS - MUSE: 1 DEGREES
RBC # BLD AUTO: 3.95 10E6/UL (ref 3.8–5.2)
RBC # BLD AUTO: 3.99 10E6/UL (ref 3.8–5.2)
SODIUM SERPL-SCNC: 138 MMOL/L (ref 136–145)
SODIUM SERPL-SCNC: 139 MMOL/L (ref 136–145)
SYSTOLIC BLOOD PRESSURE - MUSE: NORMAL MMHG
T AXIS - MUSE: 12 DEGREES
VENTRICULAR RATE- MUSE: 56 BPM
WBC # BLD AUTO: 7.4 10E3/UL (ref 4–11)
WBC # BLD AUTO: 7.8 10E3/UL (ref 4–11)

## 2023-04-05 PROCEDURE — 82310 ASSAY OF CALCIUM: CPT | Performed by: STUDENT IN AN ORGANIZED HEALTH CARE EDUCATION/TRAINING PROGRAM

## 2023-04-05 PROCEDURE — 36223 PLACE CATH CAROTID/INOM ART: CPT | Mod: XS | Performed by: RADIOLOGY

## 2023-04-05 PROCEDURE — 80048 BASIC METABOLIC PNL TOTAL CA: CPT | Performed by: STUDENT IN AN ORGANIZED HEALTH CARE EDUCATION/TRAINING PROGRAM

## 2023-04-05 PROCEDURE — 250N000013 HC RX MED GY IP 250 OP 250 PS 637: Performed by: STUDENT IN AN ORGANIZED HEALTH CARE EDUCATION/TRAINING PROGRAM

## 2023-04-05 PROCEDURE — 85730 THROMBOPLASTIN TIME PARTIAL: CPT | Performed by: STUDENT IN AN ORGANIZED HEALTH CARE EDUCATION/TRAINING PROGRAM

## 2023-04-05 PROCEDURE — 85027 COMPLETE CBC AUTOMATED: CPT | Performed by: STUDENT IN AN ORGANIZED HEALTH CARE EDUCATION/TRAINING PROGRAM

## 2023-04-05 PROCEDURE — 272N000506 HC NEEDLE CR6

## 2023-04-05 PROCEDURE — 84132 ASSAY OF SERUM POTASSIUM: CPT | Performed by: STUDENT IN AN ORGANIZED HEALTH CARE EDUCATION/TRAINING PROGRAM

## 2023-04-05 PROCEDURE — 82330 ASSAY OF CALCIUM: CPT | Performed by: STUDENT IN AN ORGANIZED HEALTH CARE EDUCATION/TRAINING PROGRAM

## 2023-04-05 PROCEDURE — C1769 GUIDE WIRE: HCPCS

## 2023-04-05 PROCEDURE — 36224 PLACE CATH CAROTD ART: CPT | Mod: RT | Performed by: RADIOLOGY

## 2023-04-05 PROCEDURE — 83735 ASSAY OF MAGNESIUM: CPT | Performed by: STUDENT IN AN ORGANIZED HEALTH CARE EDUCATION/TRAINING PROGRAM

## 2023-04-05 PROCEDURE — 36415 COLL VENOUS BLD VENIPUNCTURE: CPT | Performed by: STUDENT IN AN ORGANIZED HEALTH CARE EDUCATION/TRAINING PROGRAM

## 2023-04-05 PROCEDURE — 36226 PLACE CATH VERTEBRAL ART: CPT | Mod: 50

## 2023-04-05 PROCEDURE — 255N000002 HC RX 255 OP 636: Performed by: RADIOLOGY

## 2023-04-05 PROCEDURE — B31RYZZ FLUOROSCOPY OF INTRACRANIAL ARTERIES USING OTHER CONTRAST: ICD-10-PCS | Performed by: RADIOLOGY

## 2023-04-05 PROCEDURE — 272N000566 HC SHEATH CR3

## 2023-04-05 PROCEDURE — 84100 ASSAY OF PHOSPHORUS: CPT | Performed by: STUDENT IN AN ORGANIZED HEALTH CARE EDUCATION/TRAINING PROGRAM

## 2023-04-05 PROCEDURE — 250N000009 HC RX 250: Performed by: STUDENT IN AN ORGANIZED HEALTH CARE EDUCATION/TRAINING PROGRAM

## 2023-04-05 PROCEDURE — 36227 PLACE CATH XTRNL CAROTID: CPT | Mod: RT | Performed by: RADIOLOGY

## 2023-04-05 PROCEDURE — 99152 MOD SED SAME PHYS/QHP 5/>YRS: CPT | Mod: GC | Performed by: RADIOLOGY

## 2023-04-05 PROCEDURE — C1887 CATHETER, GUIDING: HCPCS

## 2023-04-05 PROCEDURE — 85610 PROTHROMBIN TIME: CPT | Performed by: STUDENT IN AN ORGANIZED HEALTH CARE EDUCATION/TRAINING PROGRAM

## 2023-04-05 PROCEDURE — 97535 SELF CARE MNGMENT TRAINING: CPT | Mod: GO | Performed by: OCCUPATIONAL THERAPIST

## 2023-04-05 PROCEDURE — 36225 PLACE CATH SUBCLAVIAN ART: CPT | Mod: XS | Performed by: RADIOLOGY

## 2023-04-05 PROCEDURE — 99152 MOD SED SAME PHYS/QHP 5/>YRS: CPT

## 2023-04-05 PROCEDURE — 36227 PLACE CATH XTRNL CAROTID: CPT

## 2023-04-05 PROCEDURE — 250N000011 HC RX IP 250 OP 636: Performed by: STUDENT IN AN ORGANIZED HEALTH CARE EDUCATION/TRAINING PROGRAM

## 2023-04-05 PROCEDURE — 36226 PLACE CATH VERTEBRAL ART: CPT | Mod: RT | Performed by: RADIOLOGY

## 2023-04-05 PROCEDURE — 258N000003 HC RX IP 258 OP 636: Performed by: STUDENT IN AN ORGANIZED HEALTH CARE EDUCATION/TRAINING PROGRAM

## 2023-04-05 PROCEDURE — 250N000011 HC RX IP 250 OP 636: Performed by: RADIOLOGY

## 2023-04-05 PROCEDURE — 36224 PLACE CATH CAROTD ART: CPT | Mod: 50

## 2023-04-05 PROCEDURE — 99239 HOSP IP/OBS DSCHRG MGMT >30: CPT | Mod: GC | Performed by: PSYCHIATRY & NEUROLOGY

## 2023-04-05 RX ORDER — NALOXONE HYDROCHLORIDE 0.4 MG/ML
0.4 INJECTION, SOLUTION INTRAMUSCULAR; INTRAVENOUS; SUBCUTANEOUS
Status: DISCONTINUED | OUTPATIENT
Start: 2023-04-05 | End: 2023-04-05 | Stop reason: HOSPADM

## 2023-04-05 RX ORDER — FLUMAZENIL 0.1 MG/ML
0.2 INJECTION, SOLUTION INTRAVENOUS
Status: DISCONTINUED | OUTPATIENT
Start: 2023-04-05 | End: 2023-04-05 | Stop reason: HOSPADM

## 2023-04-05 RX ORDER — NALOXONE HYDROCHLORIDE 0.4 MG/ML
0.2 INJECTION, SOLUTION INTRAMUSCULAR; INTRAVENOUS; SUBCUTANEOUS
Status: DISCONTINUED | OUTPATIENT
Start: 2023-04-05 | End: 2023-04-05 | Stop reason: HOSPADM

## 2023-04-05 RX ORDER — LISINOPRIL 5 MG/1
5 TABLET ORAL DAILY
Status: DISCONTINUED | OUTPATIENT
Start: 2023-04-05 | End: 2023-04-05 | Stop reason: HOSPADM

## 2023-04-05 RX ORDER — ONDANSETRON 4 MG/1
4 TABLET, ORALLY DISINTEGRATING ORAL EVERY 6 HOURS PRN
Qty: 10 TABLET | Refills: 0 | Status: SHIPPED | OUTPATIENT
Start: 2023-04-05

## 2023-04-05 RX ORDER — PROCHLORPERAZINE 25 MG
12.5 SUPPOSITORY, RECTAL RECTAL EVERY 12 HOURS PRN
Status: DISCONTINUED | OUTPATIENT
Start: 2023-04-05 | End: 2023-04-05

## 2023-04-05 RX ORDER — SODIUM CHLORIDE 9 MG/ML
INJECTION, SOLUTION INTRAVENOUS CONTINUOUS
Status: DISCONTINUED | OUTPATIENT
Start: 2023-04-05 | End: 2023-04-05 | Stop reason: HOSPADM

## 2023-04-05 RX ORDER — FENTANYL CITRATE 50 UG/ML
25-50 INJECTION, SOLUTION INTRAMUSCULAR; INTRAVENOUS EVERY 5 MIN PRN
Status: DISCONTINUED | OUTPATIENT
Start: 2023-04-05 | End: 2023-04-05 | Stop reason: HOSPADM

## 2023-04-05 RX ORDER — LISINOPRIL 5 MG/1
5 TABLET ORAL DAILY
Qty: 60 TABLET | Refills: 0 | Status: SHIPPED | OUTPATIENT
Start: 2023-04-05 | End: 2023-06-04

## 2023-04-05 RX ORDER — ENOXAPARIN SODIUM 100 MG/ML
40 INJECTION SUBCUTANEOUS EVERY 24 HOURS
Status: DISCONTINUED | OUTPATIENT
Start: 2023-04-05 | End: 2023-04-05 | Stop reason: HOSPADM

## 2023-04-05 RX ORDER — IODIXANOL 320 MG/ML
100 INJECTION, SOLUTION INTRAVASCULAR ONCE
Status: COMPLETED | OUTPATIENT
Start: 2023-04-05 | End: 2023-04-05

## 2023-04-05 RX ORDER — PROCHLORPERAZINE MALEATE 5 MG
5 TABLET ORAL EVERY 6 HOURS PRN
Status: DISCONTINUED | OUTPATIENT
Start: 2023-04-05 | End: 2023-04-05

## 2023-04-05 RX ORDER — POTASSIUM CHLORIDE 750 MG/1
40 TABLET, EXTENDED RELEASE ORAL ONCE
Status: COMPLETED | OUTPATIENT
Start: 2023-04-05 | End: 2023-04-05

## 2023-04-05 RX ORDER — ONDANSETRON 4 MG/1
4 TABLET, ORALLY DISINTEGRATING ORAL EVERY 6 HOURS PRN
Status: DISCONTINUED | OUTPATIENT
Start: 2023-04-05 | End: 2023-04-05

## 2023-04-05 RX ORDER — ONDANSETRON 2 MG/ML
4 INJECTION INTRAMUSCULAR; INTRAVENOUS EVERY 6 HOURS PRN
Status: DISCONTINUED | OUTPATIENT
Start: 2023-04-05 | End: 2023-04-05

## 2023-04-05 RX ORDER — HEPARIN SODIUM 200 [USP'U]/100ML
1 INJECTION, SOLUTION INTRAVENOUS CONTINUOUS PRN
Status: DISCONTINUED | OUTPATIENT
Start: 2023-04-05 | End: 2023-04-05 | Stop reason: HOSPADM

## 2023-04-05 RX ORDER — HEPARIN SODIUM 1000 [USP'U]/ML
2000 INJECTION, SOLUTION INTRAVENOUS; SUBCUTANEOUS
Status: DISCONTINUED | OUTPATIENT
Start: 2023-04-05 | End: 2023-04-05

## 2023-04-05 RX ORDER — SODIUM CHLORIDE 9 MG/ML
INJECTION, SOLUTION INTRAVENOUS CONTINUOUS
Status: DISCONTINUED | OUTPATIENT
Start: 2023-04-05 | End: 2023-04-05

## 2023-04-05 RX ORDER — LIDOCAINE 4 G/G
1 PATCH TOPICAL EVERY 24 HOURS
Qty: 7 PATCH | Refills: 0 | Status: SHIPPED | OUTPATIENT
Start: 2023-04-05

## 2023-04-05 RX ORDER — LIDOCAINE 40 MG/G
CREAM TOPICAL
Status: DISCONTINUED | OUTPATIENT
Start: 2023-04-05 | End: 2023-04-05

## 2023-04-05 RX ADMIN — SODIUM CHLORIDE: 9 INJECTION, SOLUTION INTRAVENOUS at 01:49

## 2023-04-05 RX ADMIN — POTASSIUM CHLORIDE 40 MEQ: 750 TABLET, EXTENDED RELEASE ORAL at 09:52

## 2023-04-05 RX ADMIN — FENTANYL CITRATE 50 MCG: 50 INJECTION, SOLUTION INTRAMUSCULAR; INTRAVENOUS at 08:39

## 2023-04-05 RX ADMIN — HEPARIN SODIUM 2000 UNITS: 1000 INJECTION INTRAVENOUS; SUBCUTANEOUS at 08:55

## 2023-04-05 RX ADMIN — POTASSIUM & SODIUM PHOSPHATES POWDER PACK 280-160-250 MG 1 PACKET: 280-160-250 PACK at 12:02

## 2023-04-05 RX ADMIN — LIDOCAINE HYDROCHLORIDE 10 ML: 10 INJECTION, SOLUTION EPIDURAL; INFILTRATION; INTRACAUDAL; PERINEURAL at 08:44

## 2023-04-05 RX ADMIN — LIDOCAINE PATCH 4% 1 PATCH: 40 PATCH TOPICAL at 09:52

## 2023-04-05 RX ADMIN — ACETAMINOPHEN 650 MG: 325 TABLET, FILM COATED ORAL at 01:48

## 2023-04-05 RX ADMIN — IODIXANOL 35 ML: 320 INJECTION, SOLUTION INTRAVASCULAR at 09:38

## 2023-04-05 RX ADMIN — ACETAMINOPHEN 650 MG: 325 TABLET, FILM COATED ORAL at 12:02

## 2023-04-05 RX ADMIN — LISINOPRIL 5 MG: 5 TABLET ORAL at 12:02

## 2023-04-05 RX ADMIN — MIDAZOLAM 1 MG: 1 INJECTION INTRAMUSCULAR; INTRAVENOUS at 08:39

## 2023-04-05 RX ADMIN — HEPARIN SODIUM 3 BAG: 200 INJECTION, SOLUTION INTRAVENOUS at 08:33

## 2023-04-05 ASSESSMENT — ACTIVITIES OF DAILY LIVING (ADL)
ADLS_ACUITY_SCORE: 24
ADLS_ACUITY_SCORE: 28

## 2023-04-05 ASSESSMENT — VISUAL ACUITY
OU: NORMAL ACUITY

## 2023-04-05 NOTE — DISCHARGE SUMMARY
"[facility]  NEUROLOGY CRITICAL CARE DISCHARGE SUMMARY    Patient Name:  Danna Fairchild  MRN:  [id number]      :  1955      Date of Admission:  4/3/2023  Date of Discharge::  2023  Admitting Physician:  Genaro Garcia MD  Discharge Physician:  Genaro Garcia MD  Primary Care Provider:   [facility], [place]  Discharge Disposition:   Discharged to home    Admission Diagnoses  1. Headache  2. LOC with Fall  3. Left Homonymous Hemianopsia  4. HLD  5. Allergic Rhinitis  6. Urge Incontinence  7. Osteoporosis  8. Anxiety  9. Depression  10. Insomnia    Discharge Diagnoses  1. Right Basal Ganglia ICH (ICH Score 1)  2. Left Homonymous Hemianopsia  3. Concern for Fibromuscular Dysplasia  4. HTN  5. HLD  6. Allergic Rhinitis  7. Urge Incontinence  8. Osteoporosis  9. Anxiety  10. Depression  11. Insomnia     Brief History of Present Illness   Danna Fairchild is a 68 year old woman with a past medical history of urge incontinence, osteoporosis, allergic rhinitis, HLD, depression/anxiety, insomnia who was transferred from [facility] to [facility] on 4/3/23 for intraparenchymal hemorrhage. She presented to [facility] on 23 with headache and nausea/vomiting around 14:30. Recent history of fall and head trauma (3/30) with LOC. Stroke code was activated on arrival to [facility], exam notable for left hemianopia per chart review. She was found to have a right occipital/parietal ICH with IVH. Blood pressure elevated to 150/160s systolic, lowered with nicardipine drip. Given fentanyl and hydromorphone for pain. Subsequently transferred to [facility] for neuro critical care management.      On history and exam at bedside today Ms. Fairchild notes that she currently has a 5 out of 10 headache.  She endorses being largely helpfully at baseline without history of chronic headaches.  She notes that day of presentation 2023 she had sudden onset of \"worst " "headache of my life\" at approximately 2:00 PM a friend drove her home and she took a nap, when she woke up she attempted to use her phone and realized that she could not see out of the left side of her vision, subsequently presented to the emergency department at [facility]  Evaluation was done and found a right ICH.  She been experiencing some severe headache and nausea on and off since initial onset of severe headache at 2 PM.  No trauma on day presentation 4/2, however she did note that she recently had a fall where she hit her head.  Early a.m. on 3/30 she had awoken from sleep and went to use the restroom.  After using the bathroom and standing up from seated she had an episode that she describes as \"vasovagal\" fainting.  She notes that this is happened to her 1 time previously in her life.  She woke up on the floor of the bathroom.  She felt mostly normal and went back to bed afterwards.  She later discovered a bump on her head that was tender to the touch, but not until a day or 2 later.  She does endorse a lot of recent stress including sick family pet who is currently hospitalized at the [facility].  She attributed her spell to a combination of stress and syncope.  No history of seizures or epilepsy.  No tongue bite or urinary incontinence.     She has not been feeling unwell recently, no fever, chills, diarrhea, cough, shortness of breath, chest pain.  No nausea, vomiting (prior to onset of headache).    Please see H&P dated 4/3/2023 for complete HPI.       Hospital Course by Problem   #Right Occipitoparietal ICH (ICH Score 1) of unclear etiology  #Left Homonymous Hemianopsia  #Concern for Fibromuscular Dysplasia  Patient presented on 4/3/2023 as a transfer from [facility] where she had originally arrived with reports of a sudden onset HA that caused her to lose consciousness. When she woke, she had developed new left homonymous hemianopsia and was subsequently discovered to have a right " occipitoparietal ICH. Etiology of this bleed remains somewhat unclear as the patient had only minimal prior risk factors (ie HLD). Work-up this hospitalization has likewise been mostly unrevealing with CTA Head/Neck MRI Brain, MRV Head, and Cerebral Angiogram noting only likely fibromuscular dysplasia in the bilateral ICA's and L vertebral artery. The patient continued to do well and was ultimately discharged to home with plan to follow-up with Vascular Neurosurgery with repeat MRI Brain in 3mo. Was also given a referral for OT given her vision changes  - Repeat MRI Brain w/ and w/o in 3mo  - Follow-up with Dr Hitchcock in the [facility] in 3mo  - Follow-up with your primary physician in the next 1-2 weeks for post-hospitalization follow-up for further reduction of risk factors  - Occupational Therapy referral for further vision rehab  - May take Tylenol 650mg Q6h prn. Do not take more than 3g total in 1d  - AVOID NSAIDs (ex aspirin, ibuprofen, excedrin, etc) for at least 1mo  - Do NOT drive. If your vision improves, do not drive until assessed with a formal driving test through the [facility] or [employer]  - Zofran 4mg Q6h prn  - Lidocaine Patch daily prn    #HTN  Patient does not reportedly have prior HTN. Her current BP's in hospital may be from her recent ICH, but will continue to monitor  - Lisinopril 5mg daily   - Further titration per your primary care physician   - BP Goal < 120/80   - Start taking your BP on a daily basis. Take this while sitting down and relaxed in the morning, after using the restroom and before drinking caffeine. You may also take your BP 1-2 other times throughout the day and if you ever feel lightheaded    #HLD  - Continue PTA Atorvastatin 10mg daily    #Urge Incontinence  - Continue PTA Oxybutynin 5mg daily    #Osteoporosis  - Continue PTA Alendronate weekly    #Anxiety/Depression  #Insomnia  - Continue PTA Trazodone 100mg Qhs     Pertinent Investigations    CTH (2023 @ 19:11)  IMPRESSION:   1.  Approximately 3.3 cm intraparenchymal hematoma right occipital lobe and parieto-occipital lobes.  2.  There is hemorrhagic extension into the ventricular system and basal cisterns of the posterior fossa.  3.  No midline shift.    CTA Head/Neck (2023)  IMPRESSION:   HEAD CTA:   1.  No contrast extravasation.  2.  No vascular occlusion, stenosis, aneurysm or high flow vascular malformation.  NECK CTA:  1.  No hemodynamically significant stenosis in the neck vessels.   2.  No evidence for dissection.  3.  Changes of fibromuscular dysplasia involving bilateral internal carotid arteries and left vertebral artery V1 and V3 segments.    CTH (2023 @ 23:17)  IMPRESSION:  1.  Stable acute right occipital lobe intraparenchymal hemorrhage with mild amount of surrounding edema and associated subarachnoid hemorrhage.  2.  Stable intraventricular extension of hemorrhage.  3.  Ventricular size stable in the interval and within normal limits for age.    MRI Brain w/ and w/o contrast (4/3/2023)  IMPRESSION:   1.  Similar appearance of right occipital intraparenchymal hemorrhage  with intraventricular extension and small right temporoparietal  convexity subdural hemorrhage. No hydrocephalus.    MRV Head (4/3/2023)  IMPRESSION:  1.  No evidence of  thrombus intracranially within the major  intracranial venous sinuses.    Cerebral Angiogram (2023)  Formal Read pending     Consultations     Neuro-IR     Physical Examination   Temp: 97.8  F (36.6  C) Temp  Min: 97.8  F (36.6  C)  Max: 99.5  F (37.5  C)  Resp: 14 Resp  Min: 9  Max: 47  SpO2: 98 % SpO2  Min: 94 %  Max: 99 %  Pulse: 65 Pulse  Min: 55  Max: 87    No data recorded  BP: (!) 153/78 Systolic (24hrs), Av , Min:110 , Max:158   Diastolic (24hrs), Av, Min:56, Max:85  General:  Adult, in NAD, cooperative  HEENT:  Normocephalic/atraumatic, no icterus  Cardiac:  Appears well-perfused  Chest: No increased work of breathing  on RA  Abdomen: Non-distended  Extremities:  No LE swelling.    Skin:  No rash or lesion.    Psych:  Mood pleasant, affect congruent  Neuro:  Mental status:  Awake, alert, attentive, oriented to person, place, mo/year, and situation. Able to answer questions and follow 3-step complex commands that cross midline. Naming intact. Speech is clear and fluid, no concern for aphasia or neglect  Cranial nerves: PERRL, conjugate gaze, EOMI w/o nystagmus, L homonymous hemianopsia, facial sensation intact, no facial asymmetry noted, hearing intact to conversation, no dysarthria, tongue is midline   Motor: Normal bulk and tone. No abnormal movements noted. Strength is 5/5 in all extremities in both proximal and distal muscle groups  Reflexes: 2+, brisk, and symmetric reflexes in the bilateral biceps, brachioradialis, knees, and ankles. Down-going toes bilaterally, no clonus  Sensory:  Intact to light touch in all extremities w/o extinction  Coordination:  Intact FNF and HTS.  Gait: Deferred       Discharge Plan     Discharge Medications  Current Discharge Medication List      START taking these medications    Details   Lidocaine (LIDOCARE) 4 % Patch Place 1 patch onto the skin every 24 hours To prevent lidocaine toxicity, patient should be patch free for 12 hrs daily.  Qty: 7 patch, Refills: 0    Associated Diagnoses: Low back pain, unspecified back pain laterality, unspecified chronicity, unspecified whether sciatica present      lisinopril (ZESTRIL) 5 MG tablet 1 tablet (5 mg) by Oral or Feeding Tube route daily for 60 days  Qty: 60 tablet, Refills: 0    Associated Diagnoses: Hypertension, unspecified type      ondansetron (ZOFRAN ODT) 4 MG ODT tab Take 1 tablet (4 mg) by mouth every 6 hours as needed for nausea or vomiting  Qty: 10 tablet, Refills: 0    Associated Diagnoses: Nontraumatic subcortical hemorrhage of right cerebral hemisphere (H)         CONTINUE these medications which have NOT CHANGED    Details   alendronate  (FOSAMAX) 70 MG tablet Take 70 mg by mouth every 7 days      atorvastatin (LIPITOR) 10 MG tablet Take 10 mg by mouth daily      traZODone (DESYREL) 100 MG tablet Take 100 mg by mouth At Bedtime             Discharge Recommendations     MR Brain w/o & w Contrast     Occupational Therapy Referral      Neurosurgery Referral      Reason for your hospital stay    You were hospitalized due to bleeding in the right side of your brain, which helps to control the left-side vision     Activity    Your activity upon discharge: activity as tolerated    Do not drive. If your vision improves, do not drive until cleared after a driving assessment with either the DMV or [place] [place]     Discharge Instructions    - Avoid NSAIDs (ex aspirin, ibuprofen, excedrin, motrin, etc) for at least 1 mo  - Tylenol is ok. Avoid taking more than a total of 3g per day  - Do not drive. If your vision improves, do not drive until cleared by a formal driving assessment with either DMV or [employer]  -Start to take your blood pressure on a daily basis. Try to take it while sitting down, relaxed in the morning after you've gone to the bathroom but before you've had any caffeine     Follow Up ([facility])    Follow up with primary care provider, [facility], within 7 days to evaluate medication change, for hospital follow- up, and regarding new diagnosis.  No follow up labs or test are needed.      Follow up with Dr. Hitchcock , at [facility], in 3 months for follow-up of a right hemorrhagic stroke. The following labs/tests are recommended: repeat MRI Brain.    Appointments on [place] and/or [facility] (with [facility] or [facility] provider or service). Call [phone] if you haven't heard regarding these appointments within 7 days of discharge.     Blood pressure monitor     Diet    Follow this diet upon discharge: Orders Placed This Encounter      Advance Diet as Tolerated: Clear Liquid Diet       The patient  was seen and discussed with the attending physician, Dr. Garcia.     Zakiya Jimenez MD  Neurology PGY-2  04/05/2023 11:31 AM     EOAE (evoked otoacoustic emission)

## 2023-04-05 NOTE — PROGRESS NOTES
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 She did have a fall 3d prior with sudden onset HA leading to likely syncopal event with LOC and head hit. Given the location of the bleed and associated IVH, she was transferred to [facility] for further monitoring. A diagnostic Cerebral angiogram was requested for further evaluation of etiology of hemorrhage.     Medical History:  No past medical history on file.    Surgical History:  No past surgical history on file.    Family History:  No family history on file.    Social History:  Social History     Socioeconomic History     Marital status:      Spouse name: Not on file     Number of children: Not on file     Years of education: Not on file     Highest education level: Not on file   Occupational History     Not on file   Tobacco Use     Smoking status: Not on file     Smokeless tobacco: Not on file   Substance and Sexual Activity     Alcohol use: Not on file     Drug use: Not on file     Sexual activity: Not on file   Other Topics Concern     Not on file   Social History Narrative     Not on file     Social Determinants of Health     Financial Resource Strain: Not on file   Food Insecurity: Not on file   Transportation Needs: Not on file   Physical Activity: Not on file   Stress: Not on file   Social Connections: Not on file   Intimate Partner Violence: Not on file   Housing Stability: Not on file       Allergies:  No Known Allergies    Is there a contrast allergy?  No    Medications:  Medications Prior to Admission   Medication Sig Dispense Refill Last Dose     alendronate (FOSAMAX) 70 MG tablet Take 70 mg by mouth every 7 days   4/3/2023     atorvastatin (LIPITOR) 10  MG tablet Take 10 mg by mouth daily   4/3/2023     traZODone (DESYREL) 100 MG tablet Take 100 mg by mouth At Bedtime   4/2/2023   .    ROS:  The 10 point Review of Systems is negative other than noted in the HPI or here.     PHYSICAL EXAMINATION  Vital Signs:  B/P: 133/73,  T: 99.3,  P: 61,  R: 8    Cardio:  RRR  Pulmonary:  no respiratory distress  Abdomen:  soft, non-tender, non-distended    Neurologic  Mental Status:  fully alert, attentive and oriented, follows commands, speech clear and fluent  Cranial Nerves:  PERRL, EOMI with normal smooth pursuit, facial sensation intact and symmetric, facial movements symmetric, hearing not formally tested but intact to conversation, palate elevation symmetric and uvula midline, no dysarthria, shoulder shrug strong bilaterally, tongue protrusion midline, Left Homonymous Hemianopia  Motor:  no abnormal movements, normal tone throughout, normal muscle bulk, no pronator drift, normal and symmetric rapid finger tapping, able to move all limbs spontaneously  Sensory:  intact/symmetric to light touch and pin prick throughout upper and lower extremities  Coordination:  FNF and HS intact without dysmetria    LABS  (most recent Cr, BUN, GFR, PLT, INR, PTT within the past 7 days):  Recent Labs   Lab 04/05/23  0539 04/03/23  0341 04/02/23  1910   CR 0.41*   < > 0.55   BUN 8.1   < > 11.7   GFRESTIMATED >90   < > >90      < > 250   INR  --   --  0.95   PTT  --   --  23    < > = values in this interval not displayed.        Platelet Function P2Y12 (PRU):  Not applicable    ASSESSMENT:  Angiogram for etiology of ICH.     PLAN:    - Diagnostic Cerebral Angiogram  - Radial or Femoral Access  - Moderate Sedation  - Use of Closure Device     PRE-PROCEDURE SEDATION ASSESSMENT     Pre-Procedure Sedation Assessment done at 0800.    Expected Level:  Moderate Sedation    Indication:  Sedation is required to allow for neurointerventional procedure.    Consent obtained from patient after  discussing the risks, benefits and alternatives.     PO Intake:  Appropriately NPO for procedure    ASA Class:  Class 2 - MILD SYSTEMIC DISEASE, NO ACUTE PROBLEMS, NO FUNCTIONAL LIMITATIONS.    Mallampati:  Grade 1:  Soft palate, uvula, tonsillar pillars, and posterior pharyngeal wall visible    History and physical reviewed and no updates needed. I have reviewed the lab findings, diagnostic data, medications, and the plan for sedation. I have determined this patient to be an appropriate candidate for the planned sedation and procedure and have reassessed the patient IMMEDIATELY PRIOR to sedation and procedure.    Patient was discussed with the Attending, Dr. Cortez, who agrees with the plan.    Parish James MD, MPH  Neuroendovascular Surgery Fellow  [facility]  Pager: [phone]

## 2023-04-05 NOTE — PROCEDURES
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    Procedure(s):   Diagnostic cervicocerebral angiography    Findings:  As above     Plan:    [] Keep Right leg flat x 6 hours  [] Repeat DSA in 7-10 days     Primary Surgeon:  Dr. Cameron Cortez  Secondary Surgeon:  Dr. Cameron Cortez  Secondary Surgeon Review:  None  Fellow:  Erika   Additional Assistants: None     Prior to the start of the procedure and with procedural staff participation, I verbally confirmed: the patient s identity using two indicators, relevant allergies, that the procedure was appropriate and matched the consent or emergent situation, and that the correct equipment/implants were available. Immediately prior to starting the procedure I conducted the Time Out with the procedural staff and re-confirmed the patient s name, procedure, and site/side. (The Joint Commission universal protocol was followed.)  Yes    PRU value: Not applicable    Anesthesia:  Conscious Sedation  Medications:  Fentanyl, Midazolam  Puncture site:  Right Femoral Artery    Fluoroscopy time (minutes):  10.4  Radiation dose (mGy):  368    Contrast amount (mL):  40     Estimated blood loss (mL):  10    Closure:  Manual    Disposition:  Will be followed in hospital by the Neuro Critical Care/Stroke team.        Sedation Post-Procedure Summary    Sedatives: Fentanyl and Midazolam (Versed)    Vital signs and pulse oximetry were monitored and remained stable throughout the procedure, and sedation was maintained until the procedure was complete.  The patient was monitored by staff until sedation discharge criteria were met.    Patient tolerance:  Patient tolerated the procedure well with no immediate complications.    Time of sedation in minutes: 40   minutes from beginning to end of physician one to one monitoring.    NAY BETH MD  Pager:  [phone]

## 2023-04-05 NOTE — IR NOTE
Patient Name: Danna Fairchild  Medical Record Number: [id number]  Today's Date: 4/5/2023    Procedure: diagnostic cerebral angiogram  Proceduralist: MD James, MD Cortez  Pathology present: N/A  Brief completed: N/A    Procedure Start: 0843  Procedure end: 0926  Sedation medications administered: 1 mg of versed, 50 mcg of fentanyl  Sedation time:  47 minutes (9037-1645)  Other: 2,000 units of IV heparin    Report given to: Ramila LINDSEY RN  : N/A    Right groin site accessed at 0847. Right groin site de-accessed at 0915 using manual pressure for closure - flat bedrest x 4 hours, till 1330.    Other Notes: Pt arrived to IR room 03 from . Consent reviewed. Pt denies any questions or concerns regarding procedure. Pt positioned supine and monitored per protocol. Pt tolerated procedure without any noted complications. Pt transferred back to .

## 2023-04-06 ENCOUNTER — CARE COORDINATION (OUTPATIENT)
Dept: NEUROSURGERY | Facility: CLINIC | Age: 68
End: 2023-04-06
Payer: COMMERCIAL

## 2023-04-06 ENCOUNTER — PATIENT OUTREACH (OUTPATIENT)
Dept: CARE COORDINATION | Facility: CLINIC | Age: 68
End: 2023-04-06
Payer: COMMERCIAL

## 2023-04-06 NOTE — PROGRESS NOTES
[facility]    Background: Transitional Care Management program identified per system criteria and reviewed by [facility] team for possible outreach.    Assessment: Upon chart review, [facility] Team member will not proceed with patient outreach related to this episode of Transitional Care Management program due to reason below:    Patient has presented to Emergency Department, been readmitted to hospital, or transferred to another hospital.    Plan: Transitional Care Management episode addressed appropriately per reason noted above.      Jodee Rodriguez  Community Health Worker  [facility]  [phone]      *Connected Care Resource Team does NOT follow patient ongoing. Referrals are identified based on internal discharge reports and the outreach is to ensure patient has an understanding of their discharge instructions.

## 2023-04-06 NOTE — PLAN OF CARE
Occupational Therapy Discharge Summary    Reason for therapy discharge:    Discharged to home with outpatient therapy.    Progress towards therapy goal(s). See goals on Care Plan in [facility] electronic health record for goal details.  Goals partially met.  Barriers to achieving goals:   discharge from facility.    Therapy recommendation(s):    Continued therapy is recommended.  Rationale/Recommendations:  Recommend OP OT to address visual deficits.

## 2023-04-12 ENCOUNTER — TELEPHONE (OUTPATIENT)
Dept: NEUROSURGERY | Facility: CLINIC | Age: 68
End: 2023-04-12
Payer: COMMERCIAL

## 2023-04-13 NOTE — TELEPHONE ENCOUNTER
RECORDS RECEIVED FROM: Internal   REASON FOR VISIT:  Nontraumatic subcortical hemorrhage of right cerebral hemisphere   Date of Appt: 4/17/23   NOTES (FOR ALL VISITS) STATUS DETAILS   OFFICE NOTE from referring provider Internal Hosp Referral   OFFICE NOTE from other specialist Internal 4/10/23 Zena Cox MD @ [facility]    DISCHARGE SUMMARY from hospital Internal 4/3/23-4/5/23 Daniel Velazquez MD @ [facility]   DISCHARGE REPORT from the ER Internal 4/2/23-4/3/23 Jose Villalta MD @ [facility] ED       MEDICATION LIST Internal    IMAGING  (FOR ALL VISITS)     IR Internal [facility]  4/5/23 IR Carotid Cerebral Angio Bilat     MRI (HEAD, NECK, SPINE) Internal [facility]  4/3/23 MR Brain  4/3/23 MRV Brain     CT (HEAD, NECK, SPINE) Internal [facility]  4/2/23 CT Head (11:17pm)  4/2/23 CTA Head Neck  4/2/23 CT Head (7:11pm)

## 2023-04-17 ENCOUNTER — PRE VISIT (OUTPATIENT)
Dept: NEUROSURGERY | Facility: CLINIC | Age: 68
End: 2023-04-17

## 2023-04-17 ENCOUNTER — VIRTUAL VISIT (OUTPATIENT)
Dept: NEUROSURGERY | Facility: CLINIC | Age: 68
End: 2023-04-17
Payer: COMMERCIAL

## 2023-04-17 DIAGNOSIS — I61.0 NONTRAUMATIC SUBCORTICAL HEMORRHAGE OF RIGHT CEREBRAL HEMISPHERE (H): ICD-10-CM

## 2023-04-17 PROCEDURE — 99202 OFFICE O/P NEW SF 15 MIN: CPT | Mod: VID | Performed by: NEUROLOGICAL SURGERY

## 2023-04-17 RX ORDER — SERTRALINE HYDROCHLORIDE 25 MG/1
TABLET, FILM COATED ORAL
COMMUNITY
Start: 2023-04-17 | End: 2023-07-16

## 2023-04-17 RX ORDER — LISINOPRIL 2.5 MG/1
2.5 TABLET ORAL DAILY
COMMUNITY
Start: 2023-04-17 | End: 2024-04-16

## 2023-04-17 NOTE — LETTER
4/17/2023       RE: Danna Fairchild  [address] [place] [address]     Dear Colleague,    Thank you for referring your patient, Danna Fairchild, to the [facility] NEUROSURGERY CLINIC [place] at [facility]. Please see a copy of my visit note below.    Virtual Visit Details    Type of service:  Video Visit     I had the pleasure to talk to Katie Adam today during a neurosurgical video clinic visit.  She gave consent for this visit.    Briefly Danna is a 68-year-old woman from [facility].  She presented to the [facility] in early April with what was described as a traumatic intracerebral hemorrhage involving the right occipital region.  During her hospitalization we evaluated the hemorrhage with CT angiography, MRI, cerebral angiography.  None of these revealed a source for the hemorrhage.  Again the source was presumed to be traumatic.    She had a rather uneventful hospital course and has been discharged home.  Since being discharged home she is doing relatively well.  She continues to improve every day.  She says that she feels more energy every day.    Today during our video call she appeared to be healthy and doing quite well.  Today we reviewed her CT of the head and also the additional imaging studies.  I emphasized that we have not found a source for the hemorrhage and we presume this is a traumatic intracerebral hemorrhage.  I did note however that at times there can be a source of the hemorrhage that is underlying the hemorrhage and is not seen on MRI because of the blood.  At this point I would like to repeat an MRI of the brain with and without contrast and also an MR angiogram in 3 months and that I would like to see her back at that point in time to review the imaging.  She is comfortable with this plan    Overall I spent 15 min with her today.      Again, thank you for allowing me to participate in the care of  your patient.      Sincerely,    Jack Hitchcock MD

## 2023-04-17 NOTE — NURSING NOTE
Chief Complaint   Patient presents with     Video Visit     Follow up- Stroke- Hospitalization      Is the patient currently in the state of [place]? YES    Visit mode:VIDEO    If the visit is dropped, the patient can be reconnected by: VIDEO VISIT: Text to cell phone: [phone]    Will anyone else be joining the visit? NO      How would you like to obtain your AVS? MyChart    Are changes needed to the allergy or medication list? NO    Reason for visit: Video Visit (Follow up- Stroke- Hospitalization )

## 2023-04-17 NOTE — PROGRESS NOTES
Virtual Visit Details    Type of service:  Video Visit     I had the pleasure to talk to Katie Adam today during a neurosurgical video clinic visit.  She gave consent for this visit.    Briefly Danna is a 68-year-old woman from [facility].  She presented to the [facility] in early April with what was described as a traumatic intracerebral hemorrhage involving the right occipital region.  During her hospitalization we evaluated the hemorrhage with CT angiography, MRI, cerebral angiography.  None of these revealed a source for the hemorrhage.  Again the source was presumed to be traumatic.    She had a rather uneventful hospital course and has been discharged home.  Since being discharged home she is doing relatively well.  She continues to improve every day.  She says that she feels more energy every day.    Today during our video call she appeared to be healthy and doing quite well.  Today we reviewed her CT of the head and also the additional imaging studies.  I emphasized that we have not found a source for the hemorrhage and we presume this is a traumatic intracerebral hemorrhage.  I did note however that at times there can be a source of the hemorrhage that is underlying the hemorrhage and is not seen on MRI because of the blood.  At this point I would like to repeat an MRI of the brain with and without contrast and also an MR angiogram in 3 months and that I would like to see her back at that point in time to review the imaging.  She is comfortable with this plan    Overall I spent 15 min with her today.

## 2023-04-18 NOTE — PATIENT INSTRUCTIONS
Follow up with Dr Hitchcock in 3 months in clinic with a MRI Brain with and without contrast and a MRA Brain without contrast.   Orders Entered.    Call Sonia RN  Neurosurgery Care Coordinator with questions/concerns     Thank you for using [facility]

## 2023-04-20 ENCOUNTER — TELEPHONE (OUTPATIENT)
Dept: NEUROSURGERY | Facility: CLINIC | Age: 68
End: 2023-04-20

## 2023-04-20 NOTE — TELEPHONE ENCOUNTER
LM for pt to call and schedule :   Return in about 3 months (around 7/17/2023) for Follow up, in person, with me. Dr Hitchcock:    F/U 3 months in clinic with MRI Brain with and without contrast and MRA Brain without contrast prior. Orders Entered.

## 2023-05-21 ENCOUNTER — HEALTH MAINTENANCE LETTER (OUTPATIENT)
Age: 68
End: 2023-05-21

## 2023-06-28 NOTE — TELEPHONE ENCOUNTER
[facility] Call Center    Phone Message    May a detailed message be left on voicemail: yes     Reason for Call: Other: Patient is wondering if her appointment with Dr. Hitchcock can be a virtual appointment. Please review.     Action Taken: Message routed to:  [facility] ([facility] Neurosurgery    Travel Screening: Not Applicable

## 2023-07-21 ENCOUNTER — ANCILLARY PROCEDURE (OUTPATIENT)
Dept: MRI IMAGING | Facility: CLINIC | Age: 68
End: 2023-07-21
Attending: NEUROLOGICAL SURGERY
Payer: COMMERCIAL

## 2023-07-21 DIAGNOSIS — I61.0 NONTRAUMATIC SUBCORTICAL HEMORRHAGE OF RIGHT CEREBRAL HEMISPHERE (H): ICD-10-CM

## 2023-07-21 PROCEDURE — 99207 MRA BRAIN (CIRCLE OF WILLIS) W/O CONTRAST: CPT | Mod: GC | Performed by: STUDENT IN AN ORGANIZED HEALTH CARE EDUCATION/TRAINING PROGRAM

## 2023-07-21 PROCEDURE — 70553 MRI BRAIN STEM W/O & W/DYE: CPT | Mod: GC | Performed by: STUDENT IN AN ORGANIZED HEALTH CARE EDUCATION/TRAINING PROGRAM

## 2023-07-21 PROCEDURE — A9585 GADOBUTROL INJECTION: HCPCS | Mod: JZ | Performed by: STUDENT IN AN ORGANIZED HEALTH CARE EDUCATION/TRAINING PROGRAM

## 2023-07-21 RX ORDER — GADOBUTROL 604.72 MG/ML
7.5 INJECTION INTRAVENOUS ONCE
Status: COMPLETED | OUTPATIENT
Start: 2023-07-21 | End: 2023-07-21

## 2023-07-21 RX ADMIN — GADOBUTROL 5 ML: 604.72 INJECTION INTRAVENOUS at 10:14

## 2023-07-21 NOTE — DISCHARGE INSTRUCTIONS
MRI Contrast Discharge Instructions    The IV contrast you received today will pass out of your body in your  urine. This will happen in the next 24 hours. You will not feel this process.  Your urine will not change color.    Drink at least 4 extra glasses of water or juice today (unless your doctor  has restricted your fluids). This reduces the stress on your kidneys.  You may take your regular medicines.    If you are on dialysis: It is best to have dialysis today.    If you have a reaction: Most reactions happen right away. If you have  any new symptoms after leaving the hospital (such as hives or swelling),  call your hospital at the correct number below. Or call your family doctor.  If you have breathing distress or wheezing, call 911.    Special instructions: ***    I have read and understand the above information.    Signature:______________________________________ Date:___________    Staff:__________________________________________ Date:___________     Time:__________    [place] [facility] Departments:    ___[place]: [phone]  ___[facility]: [phone]  ___[place]: [phone] ___[facility]: [phone]  ___[facility]: [phone]  ___[facility]: [phone]  ___[phone]  ___[facility]: [phone]  ___[phone]

## 2023-07-25 ENCOUNTER — OFFICE VISIT (OUTPATIENT)
Dept: NEUROSURGERY | Facility: CLINIC | Age: 68
End: 2023-07-25
Payer: COMMERCIAL

## 2023-07-25 VITALS
SYSTOLIC BLOOD PRESSURE: 99 MMHG | DIASTOLIC BLOOD PRESSURE: 62 MMHG | HEART RATE: 59 BPM | RESPIRATION RATE: 16 BRPM | OXYGEN SATURATION: 97 %

## 2023-07-25 DIAGNOSIS — I60.9 SUBARACHNOID HEMORRHAGE (H): Primary | ICD-10-CM

## 2023-07-25 PROCEDURE — 99213 OFFICE O/P EST LOW 20 MIN: CPT | Mod: GC | Performed by: NEUROLOGICAL SURGERY

## 2023-07-25 RX ORDER — SERTRALINE HYDROCHLORIDE 100 MG/1
100 TABLET, FILM COATED ORAL DAILY
COMMUNITY

## 2023-07-25 NOTE — LETTER
7/25/2023       RE: Danna Fairchild  [address] [place] [address]     Dear Colleague,    Thank you for referring your patient, Danna Fairchild, to the [facility] NEUROSURGERY CLINIC [place] at [facility]. Please see a copy of my visit note below.    7/25/2023  Neurosurgery Clinic Visit    Chief Complaint: Follow up from [facility]    History of present illness:  Danna Fairchild is a 68 year old y/o female presenting with a PMHx of urge incontinence, osteoporosis, allergic rhinitis, HLD, depression/anxiety, insomnia, and recent history of a right-sided occipital interparenchymal hemorrhage of unclear etiology in April. Today, she is here for her 3-month follow-up with Dr. Hitchcock. She reports that she is doing quite well. She reports she intermittently has headaches. Her vision on her L eye has a field cut, but she reports that her neuro-ophthalmologist cleared her to take the driving test, and is looking forward to that. She reports some chronic blurry vision, but attributes this to her myopia and is looking into getting new prescription glasses. She denies any other acute complaints.     Physical exam:   BP 99/62   Pulse 59   Resp 16   SpO2 97%   General: Awake and alert and in no acute distress.  Pulm: Breathing comfortably on room air  CN: Symmetric browlift, smile, tongue protrusion, palate elevation, and sternocleidomastoids. No dysarthria. Extraocular muscles are all intact. Pupils react bilaterally and equally. L-sided temporal hemianopsia.   Coordination: Intact finger-nose-finger bilaterally. Symmetric rapid alternating movements in bilateral upper extremities   Motor: No pronator drift. Good muscle bulk throughout. 5 out of 5 strength in bilateral upper and lower extremities   Sensation: Sensation grossly intact to light touch in all extremities.   Gait: Intact tandem gait.   Reflexes: 2+ reflexes bilateral biceps and patellar  tendons. Youssef's reflex negative. Bilateral clonus negative.     Imaging:  MRI and MRA 7/21/2023  Impression:  1. Decreased size of right occipital parenchymal hemorrhage and  adjacent edema. Intraventricular extension and right temporoparietal  subdural hematoma has resolved. No MRI evidence of underlying lesion  or vascular malformation.      2. Head MRA demonstrates no definite aneurysm or stenosis of the major  intracranial arteries. 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      Assessment:  # Danna is a 69 y/o F who is here to follow-up with Dr. Hitchcock after an R occipital IPH. Her imaging looks much improved, with marked resolution of the interventricular hemorrhage component of her bleed, in addition to an improvement to the edema. There was no evidence of an underlying malformation or tumor to cause the bleed. Her blood pressures have been under control and are being managed by her primary care doctor.    Plan:  - Follow-up with neurosurgery as needed. No scheduled follow-up with neurosurgery needed.    Patient seen and discussed with Dr. Jack Hitchcock MD.  Approximately 30 minutes were spent in chart and image review, evaluation of the patient and assessment of next steps.    Vishnu Aguillon MD on 7/25/2023 at 12:07 PM  PGY-1, Department of Neurosurgery  [facility]                REVIEWED ASSOCIATED CLINICAL DATA AND HISTORY FOUND IN THE MEDICAL RECORD:  Past Medical History:   No past medical history on file.    Surgical History:   Past Surgical History:   Procedure Laterality Date    IR CAROTID CEREBRAL ANGIOGRAM BILATERAL  4/5/2023       Social history:   Social History     Tobacco Use    Smoking status: Never    Smokeless tobacco: Never       Family history:   No family history on file.    Medications:  Current Outpatient Medications   Medication    alendronate (FOSAMAX) 70 MG tablet    atorvastatin (LIPITOR) 10 MG  tablet    sertraline (ZOLOFT) 100 MG tablet    traZODone (DESYREL) 100 MG tablet    Lidocaine (LIDOCARE) 4 % Patch    lisinopril (ZESTRIL) 2.5 MG tablet    lisinopril (ZESTRIL) 5 MG tablet    ondansetron (ZOFRAN ODT) 4 MG ODT tab    sertraline (ZOLOFT) 25 MG tablet     No current facility-administered medications for this visit.       Allergies:   No Known Allergies      Attestation signed by Jack Hitchcock MD at 7/26/2023  6:06 PM:  I have seen the patient with the resident and agree with the assessment and plan.        Again, thank you for allowing me to participate in the care of your patient.      Sincerely,    Jack Hitchcock MD

## 2023-07-25 NOTE — PROGRESS NOTES
7/25/2023  Neurosurgery Clinic Visit    Chief Complaint: Follow up from [facility]    History of present illness:  Danna Fairchild is a 68 year old y/o female presenting with a PMHx of urge incontinence, osteoporosis, allergic rhinitis, HLD, depression/anxiety, insomnia, and recent history of a right-sided occipital interparenchymal hemorrhage of unclear etiology in April. Today, she is here for her 3-month follow-up with Dr. Hitchcock. She reports that she is doing quite well. She reports she intermittently has headaches. Her vision on her L eye has a field cut, but she reports that her neuro-ophthalmologist cleared her to take the driving test, and is looking forward to that. She reports some chronic blurry vision, but attributes this to her myopia and is looking into getting new prescription glasses. She denies any other acute complaints.     Physical exam:   BP 99/62   Pulse 59   Resp 16   SpO2 97%   General: Awake and alert and in no acute distress.  Pulm: Breathing comfortably on room air  CN: Symmetric browlift, smile, tongue protrusion, palate elevation, and sternocleidomastoids. No dysarthria. Extraocular muscles are all intact. Pupils react bilaterally and equally. L-sided temporal hemianopsia.   Coordination: Intact finger-nose-finger bilaterally. Symmetric rapid alternating movements in bilateral upper extremities   Motor: No pronator drift. Good muscle bulk throughout. 5 out of 5 strength in bilateral upper and lower extremities   Sensation: Sensation grossly intact to light touch in all extremities.   Gait: Intact tandem gait.   Reflexes: 2+ reflexes bilateral biceps and patellar tendons. Youssef's reflex negative. Bilateral clonus negative.     Imaging:  MRI and MRA 7/21/2023  Impression:  1. Decreased size of right occipital parenchymal hemorrhage and  adjacent edema. Intraventricular extension and right temporoparietal  subdural hematoma has resolved. No MRI evidence of underlying lesion  or  vascular malformation.      2. Head MRA demonstrates no definite aneurysm or stenosis of the major  intracranial arteries. 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      Assessment:  Keith Clay is a 69 y/o F who is here to follow-up with Dr. Hitchcock after an R occipital IPH. Her imaging looks much improved, with marked resolution of the interventricular hemorrhage component of her bleed, in addition to an improvement to the edema. There was no evidence of an underlying malformation or tumor to cause the bleed. Her blood pressures have been under control and are being managed by her primary care doctor.    Plan:  - Follow-up with neurosurgery as needed. No scheduled follow-up with neurosurgery needed.    Patient seen and discussed with Dr. Jack Hitchcock MD.  Approximately 30 minutes were spent in chart and image review, evaluation of the patient and assessment of next steps.    Vishnu Aguillon MD on 7/25/2023 at 12:07 PM  PGY-1, Department of Neurosurgery  [facility]                REVIEWED ASSOCIATED CLINICAL DATA AND HISTORY FOUND IN THE MEDICAL RECORD:  Past Medical History:   No past medical history on file.    Surgical History:   Past Surgical History:   Procedure Laterality Date    IR CAROTID CEREBRAL ANGIOGRAM BILATERAL  4/5/2023       Social history:   Social History     Tobacco Use    Smoking status: Never    Smokeless tobacco: Never       Family history:   No family history on file.    Medications:  Current Outpatient Medications   Medication    alendronate (FOSAMAX) 70 MG tablet    atorvastatin (LIPITOR) 10 MG tablet    sertraline (ZOLOFT) 100 MG tablet    traZODone (DESYREL) 100 MG tablet    Lidocaine (LIDOCARE) 4 % Patch    lisinopril (ZESTRIL) 2.5 MG tablet    lisinopril (ZESTRIL) 5 MG tablet    ondansetron (ZOFRAN ODT) 4 MG ODT tab    sertraline (ZOLOFT) 25 MG tablet     No current facility-administered medications for  this visit.       Allergies:   No Known Allergies

## 2024-05-19 ENCOUNTER — HEALTH MAINTENANCE LETTER (OUTPATIENT)
Age: 69
End: 2024-05-19

## 2025-02-16 ENCOUNTER — HEALTH MAINTENANCE LETTER (OUTPATIENT)
Age: 70
End: 2025-02-16

## 2025-06-08 ENCOUNTER — HEALTH MAINTENANCE LETTER (OUTPATIENT)
Age: 70
End: 2025-06-08

## (undated) RX ORDER — LIDOCAINE HYDROCHLORIDE 10 MG/ML
INJECTION, SOLUTION EPIDURAL; INFILTRATION; INTRACAUDAL; PERINEURAL
Status: DISPENSED
Start: 2023-04-05

## (undated) RX ORDER — HEPARIN SODIUM 1000 [USP'U]/ML
INJECTION, SOLUTION INTRAVENOUS; SUBCUTANEOUS
Status: DISPENSED
Start: 2023-04-05

## (undated) RX ORDER — FENTANYL CITRATE 50 UG/ML
INJECTION, SOLUTION INTRAMUSCULAR; INTRAVENOUS
Status: DISPENSED
Start: 2023-04-05